# Patient Record
Sex: MALE | Race: BLACK OR AFRICAN AMERICAN | NOT HISPANIC OR LATINO
[De-identification: names, ages, dates, MRNs, and addresses within clinical notes are randomized per-mention and may not be internally consistent; named-entity substitution may affect disease eponyms.]

---

## 2017-01-24 ENCOUNTER — APPOINTMENT (OUTPATIENT)
Dept: ENDOCRINOLOGY | Facility: CLINIC | Age: 82
End: 2017-01-24

## 2017-03-01 ENCOUNTER — APPOINTMENT (OUTPATIENT)
Dept: ELECTROPHYSIOLOGY | Facility: CLINIC | Age: 82
End: 2017-03-01

## 2017-03-01 ENCOUNTER — APPOINTMENT (OUTPATIENT)
Dept: CARDIOLOGY | Facility: CLINIC | Age: 82
End: 2017-03-01

## 2017-04-06 ENCOUNTER — NON-APPOINTMENT (OUTPATIENT)
Age: 82
End: 2017-04-06

## 2017-04-06 ENCOUNTER — APPOINTMENT (OUTPATIENT)
Dept: ELECTROPHYSIOLOGY | Facility: CLINIC | Age: 82
End: 2017-04-06

## 2017-04-06 ENCOUNTER — APPOINTMENT (OUTPATIENT)
Dept: CARDIOLOGY | Facility: CLINIC | Age: 82
End: 2017-04-06

## 2017-04-06 VITALS
OXYGEN SATURATION: 98 % | SYSTOLIC BLOOD PRESSURE: 143 MMHG | DIASTOLIC BLOOD PRESSURE: 87 MMHG | WEIGHT: 182 LBS | HEART RATE: 56 BPM | HEIGHT: 71 IN | BODY MASS INDEX: 25.48 KG/M2

## 2017-04-06 VITALS — SYSTOLIC BLOOD PRESSURE: 108 MMHG | DIASTOLIC BLOOD PRESSURE: 72 MMHG

## 2017-04-06 DIAGNOSIS — I25.10 ATHEROSCLEROTIC HEART DISEASE OF NATIVE CORONARY ARTERY W/OUT ANGINA PECTORIS: ICD-10-CM

## 2017-04-06 RX ORDER — SITAGLIPTIN 25 MG/1
25 TABLET, FILM COATED ORAL DAILY
Qty: 30 | Refills: 0 | Status: ACTIVE | COMMUNITY
Start: 2017-04-06

## 2017-08-31 ENCOUNTER — APPOINTMENT (OUTPATIENT)
Dept: ELECTROPHYSIOLOGY | Facility: CLINIC | Age: 82
End: 2017-08-31
Payer: MEDICARE

## 2017-08-31 PROCEDURE — 93295 DEV INTERROG REMOTE 1/2/MLT: CPT

## 2017-10-04 ENCOUNTER — APPOINTMENT (OUTPATIENT)
Dept: ELECTROPHYSIOLOGY | Facility: CLINIC | Age: 82
End: 2017-10-04
Payer: MEDICARE

## 2017-10-04 ENCOUNTER — NON-APPOINTMENT (OUTPATIENT)
Age: 82
End: 2017-10-04

## 2017-10-04 VITALS — DIASTOLIC BLOOD PRESSURE: 85 MMHG | SYSTOLIC BLOOD PRESSURE: 145 MMHG | OXYGEN SATURATION: 99 % | HEART RATE: 55 BPM

## 2017-10-04 PROCEDURE — 93283 PRGRMG EVAL IMPLANTABLE DFB: CPT

## 2018-01-04 ENCOUNTER — APPOINTMENT (OUTPATIENT)
Dept: ELECTROPHYSIOLOGY | Facility: CLINIC | Age: 83
End: 2018-01-04
Payer: MEDICARE

## 2018-01-04 DIAGNOSIS — I25.5 ISCHEMIC CARDIOMYOPATHY: ICD-10-CM

## 2018-01-04 PROCEDURE — 93295 DEV INTERROG REMOTE 1/2/MLT: CPT

## 2018-01-18 PROBLEM — I25.5 ISCHEMIC CARDIOMYOPATHY: Status: ACTIVE | Noted: 2017-04-06

## 2018-03-08 ENCOUNTER — APPOINTMENT (OUTPATIENT)
Dept: ELECTROPHYSIOLOGY | Facility: CLINIC | Age: 83
End: 2018-03-08

## 2018-05-16 ENCOUNTER — APPOINTMENT (OUTPATIENT)
Dept: ELECTROPHYSIOLOGY | Facility: CLINIC | Age: 83
End: 2018-05-16
Payer: MEDICARE

## 2018-05-16 VITALS — DIASTOLIC BLOOD PRESSURE: 85 MMHG | SYSTOLIC BLOOD PRESSURE: 128 MMHG | HEART RATE: 72 BPM | OXYGEN SATURATION: 99 %

## 2018-05-16 PROCEDURE — 93283 PRGRMG EVAL IMPLANTABLE DFB: CPT

## 2018-07-11 ENCOUNTER — EMERGENCY (EMERGENCY)
Facility: HOSPITAL | Age: 83
LOS: 0 days | Discharge: HOME | End: 2018-07-11
Attending: EMERGENCY MEDICINE
Payer: MEDICARE

## 2018-07-11 VITALS
WEIGHT: 181 LBS | TEMPERATURE: 98 F | HEIGHT: 71 IN | DIASTOLIC BLOOD PRESSURE: 63 MMHG | SYSTOLIC BLOOD PRESSURE: 107 MMHG | RESPIRATION RATE: 17 BRPM | OXYGEN SATURATION: 100 % | HEART RATE: 72 BPM

## 2018-07-11 DIAGNOSIS — I10 ESSENTIAL (PRIMARY) HYPERTENSION: ICD-10-CM

## 2018-07-11 DIAGNOSIS — M54.5 LOW BACK PAIN: ICD-10-CM

## 2018-07-11 DIAGNOSIS — E11.9 TYPE 2 DIABETES MELLITUS WITHOUT COMPLICATIONS: ICD-10-CM

## 2018-07-11 DIAGNOSIS — Z95.5 PRESENCE OF CORONARY ANGIOPLASTY IMPLANT AND GRAFT: ICD-10-CM

## 2018-07-11 DIAGNOSIS — Z79.01 LONG TERM (CURRENT) USE OF ANTICOAGULANTS: ICD-10-CM

## 2018-07-11 DIAGNOSIS — M48.54XA COLLAPSED VERTEBRA, NOT ELSEWHERE CLASSIFIED, THORACIC REGION, INITIAL ENCOUNTER FOR FRACTURE: ICD-10-CM

## 2018-07-11 DIAGNOSIS — I25.10 ATHEROSCLEROTIC HEART DISEASE OF NATIVE CORONARY ARTERY WITHOUT ANGINA PECTORIS: ICD-10-CM

## 2018-07-11 DIAGNOSIS — M25.552 PAIN IN LEFT HIP: ICD-10-CM

## 2018-07-11 DIAGNOSIS — Z79.82 LONG TERM (CURRENT) USE OF ASPIRIN: ICD-10-CM

## 2018-07-11 DIAGNOSIS — Z95.5 PRESENCE OF CORONARY ANGIOPLASTY IMPLANT AND GRAFT: Chronic | ICD-10-CM

## 2018-07-11 DIAGNOSIS — Z79.4 LONG TERM (CURRENT) USE OF INSULIN: ICD-10-CM

## 2018-07-11 DIAGNOSIS — M25.551 PAIN IN RIGHT HIP: ICD-10-CM

## 2018-07-11 LAB
BASE EXCESS BLDA CALC-SCNC: 1 MMOL/L — SIGNIFICANT CHANGE UP (ref -2–2)
BLOOD GAS COMMENTS: SIGNIFICANT CHANGE UP
BLOOD GAS SOURCE: SIGNIFICANT CHANGE UP
HCO3 BLDA-SCNC: 26 MMOL/L — SIGNIFICANT CHANGE UP (ref 21–29)
HOROWITZ INDEX BLDA+IHG-RTO: 21 — SIGNIFICANT CHANGE UP
PCO2 BLDA: 46 MMHG — SIGNIFICANT CHANGE UP (ref 32–46)
PH BLD: 7.38 — SIGNIFICANT CHANGE UP (ref 7.35–7.45)
PO2 BLDA: 75 MMHG — SIGNIFICANT CHANGE UP (ref 74–108)
SAO2 % BLDA: 94 % — SIGNIFICANT CHANGE UP (ref 92–96)

## 2018-07-11 PROCEDURE — 99284 EMERGENCY DEPT VISIT MOD MDM: CPT

## 2018-07-11 PROCEDURE — 73521 X-RAY EXAM HIPS BI 2 VIEWS: CPT | Mod: 26

## 2018-07-11 PROCEDURE — 72100 X-RAY EXAM L-S SPINE 2/3 VWS: CPT | Mod: 26

## 2018-07-11 RX ORDER — OXYCODONE AND ACETAMINOPHEN 5; 325 MG/1; MG/1
1 TABLET ORAL ONCE
Qty: 0 | Refills: 0 | Status: DISCONTINUED | OUTPATIENT
Start: 2018-07-11 | End: 2018-07-11

## 2018-07-11 RX ORDER — IBUPROFEN 200 MG
1 TABLET ORAL
Qty: 20 | Refills: 0
Start: 2018-07-11 | End: 2018-07-15

## 2018-07-11 RX ORDER — OXYCODONE AND ACETAMINOPHEN 5; 325 MG/1; MG/1
1 TABLET ORAL
Qty: 16 | Refills: 0
Start: 2018-07-11 | End: 2018-07-14

## 2018-07-11 RX ORDER — IBUPROFEN 200 MG
600 TABLET ORAL ONCE
Qty: 0 | Refills: 0 | Status: COMPLETED | OUTPATIENT
Start: 2018-07-11 | End: 2018-07-11

## 2018-07-11 RX ADMIN — OXYCODONE AND ACETAMINOPHEN 1 TABLET(S): 5; 325 TABLET ORAL at 17:49

## 2018-07-11 RX ADMIN — Medication 600 MILLIGRAM(S): at 17:49

## 2018-07-11 NOTE — ED PROVIDER NOTE - OBJECTIVE STATEMENT
83 yo M with b/l hip and lower back pain, no trauma.  Pt. says he had the pain for years, but now worse for 2 days.  He's having trouble getting up and walking.  No inciting event.  No other stressors.  Pt. usually ambulates with a cane, now he can't get up by himself 2/2 pain.  No other complaints, no incontinence, no saddle anesthesia, normal sensation in legs b/l.  ROS: negative for fever, cough, headache, chest pain, shortness of breath, abd pain, nausea, vomiting, diarrhea, rash, paresthesia, and weakness--all other systems reviewed are negative.   PMH: DM, HTN, CAD s/p stent, on plavix, dementia; Meds: See EMR; SH: Denies smoking/drinking/drug use

## 2018-07-11 NOTE — ED ADULT NURSE NOTE - PMH
BPH (benign prostatic hyperplasia)    CKD (chronic kidney disease) stage 3, GFR 30-59 ml/min    Coronary artery disease involving native coronary artery of native heart, angina presence unspecified    Diabetes    Glaucoma    Hyperlipidemia, unspecified hyperlipidemia type    Hypertension    Ischemic cardiomyopathy    Left ventricular thrombus    Memory loss    ST elevation myocardial infarction (STEMI), unspecified artery    Stented coronary artery  5 stents  Systolic congestive heart failure, unspecified congestive heart failure chronicity    Thyroid nodule

## 2018-07-11 NOTE — ED PROVIDER NOTE - PHYSICAL EXAMINATION
Vitals: WNL  Gen: AAOx3, NAD, sitting comfortably in stretcher  Head: ncat, perrla, eomi b/l  Neck: supple, no lymphadenopathy, no midline deviation  Heart: rrr, no m/r/g  Lungs: CTA b/l, no rales/ronchi/wheezes  Abd: soft, nontender, non-distended, no rebound or guarding  Ext: no clubbing/cyanosis/edema  Neuro: sensation and muscle strength intact b/l, moving all 4 ext with equal strength, no deficits

## 2018-07-11 NOTE — ED PROVIDER NOTE - CARE PLAN
Principal Discharge DX:	Lumbar muscle pain Principal Discharge DX:	Lumbar muscle pain  Secondary Diagnosis:	Non-traumatic compression fracture of twelfth thoracic vertebra, initial encounter

## 2018-07-19 ENCOUNTER — APPOINTMENT (OUTPATIENT)
Dept: ELECTROPHYSIOLOGY | Facility: CLINIC | Age: 83
End: 2018-07-19
Payer: MEDICARE

## 2018-07-19 DIAGNOSIS — I50.20 UNSPECIFIED SYSTOLIC (CONGESTIVE) HEART FAILURE: ICD-10-CM

## 2018-07-19 PROCEDURE — 93295 DEV INTERROG REMOTE 1/2/MLT: CPT

## 2018-07-19 PROCEDURE — 93296 REM INTERROG EVL PM/IDS: CPT

## 2019-01-09 ENCOUNTER — APPOINTMENT (OUTPATIENT)
Dept: ELECTROPHYSIOLOGY | Facility: CLINIC | Age: 84
End: 2019-01-09
Payer: MEDICARE

## 2019-01-09 PROCEDURE — XXXXX: CPT

## 2019-04-03 ENCOUNTER — APPOINTMENT (OUTPATIENT)
Dept: ELECTROPHYSIOLOGY | Facility: CLINIC | Age: 84
End: 2019-04-03
Payer: MEDICARE

## 2019-04-03 VITALS
WEIGHT: 182 LBS | DIASTOLIC BLOOD PRESSURE: 73 MMHG | SYSTOLIC BLOOD PRESSURE: 118 MMHG | HEART RATE: 75 BPM | OXYGEN SATURATION: 100 % | BODY MASS INDEX: 25.48 KG/M2 | HEIGHT: 71 IN

## 2019-04-03 PROCEDURE — 93283 PRGRMG EVAL IMPLANTABLE DFB: CPT

## 2019-05-01 NOTE — REASON FOR VISIT
[Follow-Up - Clinic] : a clinic follow-up of [Coronary Artery Disease] : coronary artery disease [Heart Failure] : congestive heart failure [Hypertension] : hypertension [Medication Management] : Medication management

## 2019-05-03 ENCOUNTER — APPOINTMENT (OUTPATIENT)
Dept: CARDIOTHORACIC SURGERY | Facility: CLINIC | Age: 84
End: 2019-05-03
Payer: MEDICARE

## 2019-05-03 ENCOUNTER — NON-APPOINTMENT (OUTPATIENT)
Age: 84
End: 2019-05-03

## 2019-05-03 VITALS
RESPIRATION RATE: 12 BRPM | HEART RATE: 52 BPM | SYSTOLIC BLOOD PRESSURE: 157 MMHG | DIASTOLIC BLOOD PRESSURE: 76 MMHG | OXYGEN SATURATION: 99 % | TEMPERATURE: 98 F

## 2019-05-03 VITALS — WEIGHT: 182 LBS | HEIGHT: 71 IN | BODY MASS INDEX: 25.48 KG/M2

## 2019-05-03 VITALS — DIASTOLIC BLOOD PRESSURE: 78 MMHG | SYSTOLIC BLOOD PRESSURE: 132 MMHG

## 2019-05-03 DIAGNOSIS — Z79.01 LONG TERM (CURRENT) USE OF ANTICOAGULANTS: ICD-10-CM

## 2019-05-03 DIAGNOSIS — I10 ESSENTIAL (PRIMARY) HYPERTENSION: ICD-10-CM

## 2019-05-03 DIAGNOSIS — E78.5 HYPERLIPIDEMIA, UNSPECIFIED: ICD-10-CM

## 2019-05-03 PROCEDURE — 93000 ELECTROCARDIOGRAM COMPLETE: CPT

## 2019-05-03 PROCEDURE — 99214 OFFICE O/P EST MOD 30 MIN: CPT

## 2019-05-03 NOTE — HISTORY OF PRESENT ILLNESS
[FreeTextEntry1] : Sohan presents for cardiac follow up and as per his wife requires a dental extraction, for which his warfarin would need to be interrupted. He denies any angina, dyspnea or syncope. He is not very active to due activity, not due to symptoms. His appetite is good and his bathroom habits are unremarkable. His wife reports that he has become combative, cursing at night, due to progression of dementia. He saw a neurologist six months ago. His wife states that [facility] did not help patient.

## 2019-05-03 NOTE — PHYSICAL EXAM
[Well Groomed] : well groomed [Normal Appearance] : normal appearance [General Appearance - Well Developed] : well developed [General Appearance - Well Nourished] : well nourished [General Appearance - In No Acute Distress] : no acute distress [No Deformities] : no deformities [Normal Conjunctiva] : the conjunctiva exhibited no abnormalities [Eyelids - No Xanthelasma] : the eyelids demonstrated no xanthelasmas [Normal Oral Mucosa] : normal oral mucosa [No Oral Pallor] : no oral pallor [No Oral Cyanosis] : no oral cyanosis [Normal Jugular Venous A Waves Present] : normal jugular venous A waves present [Respiration, Rhythm And Depth] : normal respiratory rhythm and effort [Normal Jugular Venous V Waves Present] : normal jugular venous V waves present [] : no respiratory distress [No Murmur] : no murmurs heard [Normal Rate] : normal [No Pitting Edema] : no pitting edema present [Abdomen Soft] : soft [Bowel Sounds] : normal bowel sounds [Nail Clubbing] : no clubbing of the fingernails [Skin Color & Pigmentation] : normal skin color and pigmentation [Abnormal Walk] : normal gait [Oriented To Time, Place, And Person] : oriented to person, place, and time [Impaired Insight] : insight and judgment were intact [Right Carotid Bruit] : no bruit heard over the right carotid [Left Carotid Bruit] : no bruit heard over the left carotid [Rt] : no varicose veins of the right leg [Lt] : no varicose veins of the left leg

## 2019-05-03 NOTE — DISCUSSION/SUMMARY
[Risks] : risks [Benefits] : benefits [Alternatives] : alternatives [___ Month(s)] : [unfilled] month(s) [With Me] : with me [Coronary Artery Disease] : coronary artery disease [Chronic Systolic Heart Failure] : chronic systolic congestive heart failure [Compensated] : compensated [Essential Hypertension] : essential hypertension [Stable] : stable [Responding to Treatment] : responding to treatment [None] : none [Patient] : the patient [Family] : the patient's family [FreeTextEntry1] : Sohan is an 83 year old male who presents for routine cardiology visit. He requires a dental extraction and will need a clearance to stop Coumadin. I would recommend stopping his Coumadin 5 days before anticipated date of his upcoming dental procedure and he can resume the dose post-op as per the oral surgeon's recommendation.  He has known severe LV dysfunction (and an AICD) with no signs or symptoms of decompensated heart failure at this time.  Repeat BP was just at goal and I am making no changes to his regimen at this time; he is not on Spironolactone in the setting of CKD.  I recommended he follow up with his Neurologist.  He also will have full labs with his PCP.

## 2019-09-19 ENCOUNTER — APPOINTMENT (OUTPATIENT)
Dept: ELECTROPHYSIOLOGY | Facility: CLINIC | Age: 84
End: 2019-09-19
Payer: MEDICARE

## 2019-09-19 PROCEDURE — 93296 REM INTERROG EVL PM/IDS: CPT

## 2019-09-19 PROCEDURE — 93295 DEV INTERROG REMOTE 1/2/MLT: CPT

## 2019-12-19 ENCOUNTER — APPOINTMENT (OUTPATIENT)
Dept: ELECTROPHYSIOLOGY | Facility: CLINIC | Age: 84
End: 2019-12-19
Payer: MEDICARE

## 2019-12-19 PROCEDURE — 93296 REM INTERROG EVL PM/IDS: CPT

## 2019-12-19 PROCEDURE — 93295 DEV INTERROG REMOTE 1/2/MLT: CPT

## 2020-03-19 ENCOUNTER — APPOINTMENT (OUTPATIENT)
Dept: ELECTROPHYSIOLOGY | Facility: CLINIC | Age: 85
End: 2020-03-19
Payer: MEDICARE

## 2020-03-19 PROCEDURE — 93295 DEV INTERROG REMOTE 1/2/MLT: CPT

## 2020-03-19 PROCEDURE — 93296 REM INTERROG EVL PM/IDS: CPT

## 2020-06-19 ENCOUNTER — APPOINTMENT (OUTPATIENT)
Dept: ELECTROPHYSIOLOGY | Facility: CLINIC | Age: 85
End: 2020-06-19
Payer: MEDICARE

## 2020-06-19 PROCEDURE — 93295 DEV INTERROG REMOTE 1/2/MLT: CPT

## 2020-06-19 PROCEDURE — 93296 REM INTERROG EVL PM/IDS: CPT

## 2020-09-18 ENCOUNTER — APPOINTMENT (OUTPATIENT)
Dept: ELECTROPHYSIOLOGY | Facility: CLINIC | Age: 85
End: 2020-09-18
Payer: MEDICARE

## 2020-09-18 PROCEDURE — 93295 DEV INTERROG REMOTE 1/2/MLT: CPT

## 2020-09-18 PROCEDURE — 93296 REM INTERROG EVL PM/IDS: CPT

## 2020-09-23 ENCOUNTER — APPOINTMENT (OUTPATIENT)
Dept: ELECTROPHYSIOLOGY | Facility: CLINIC | Age: 85
End: 2020-09-23
Payer: MEDICARE

## 2020-09-23 ENCOUNTER — NON-APPOINTMENT (OUTPATIENT)
Age: 85
End: 2020-09-23

## 2020-09-23 VITALS
WEIGHT: 178 LBS | OXYGEN SATURATION: 99 % | DIASTOLIC BLOOD PRESSURE: 78 MMHG | HEIGHT: 71 IN | HEART RATE: 52 BPM | BODY MASS INDEX: 24.92 KG/M2 | SYSTOLIC BLOOD PRESSURE: 133 MMHG

## 2020-09-23 PROCEDURE — 93283 PRGRMG EVAL IMPLANTABLE DFB: CPT

## 2020-12-17 ENCOUNTER — APPOINTMENT (OUTPATIENT)
Dept: ELECTROPHYSIOLOGY | Facility: CLINIC | Age: 85
End: 2020-12-17

## 2020-12-17 ENCOUNTER — APPOINTMENT (OUTPATIENT)
Dept: ELECTROPHYSIOLOGY | Facility: CLINIC | Age: 85
End: 2020-12-17
Payer: MEDICARE

## 2020-12-17 PROCEDURE — 93295 DEV INTERROG REMOTE 1/2/MLT: CPT

## 2020-12-17 PROCEDURE — 93296 REM INTERROG EVL PM/IDS: CPT

## 2021-03-18 ENCOUNTER — NON-APPOINTMENT (OUTPATIENT)
Age: 86
End: 2021-03-18

## 2021-03-18 ENCOUNTER — APPOINTMENT (OUTPATIENT)
Dept: ELECTROPHYSIOLOGY | Facility: CLINIC | Age: 86
End: 2021-03-18
Payer: MEDICARE

## 2021-03-18 PROCEDURE — 93295 DEV INTERROG REMOTE 1/2/MLT: CPT

## 2021-03-18 PROCEDURE — 93296 REM INTERROG EVL PM/IDS: CPT

## 2021-06-17 ENCOUNTER — APPOINTMENT (OUTPATIENT)
Dept: ELECTROPHYSIOLOGY | Facility: CLINIC | Age: 86
End: 2021-06-17
Payer: MEDICARE

## 2021-06-17 ENCOUNTER — NON-APPOINTMENT (OUTPATIENT)
Age: 86
End: 2021-06-17

## 2021-06-17 PROCEDURE — 93295 DEV INTERROG REMOTE 1/2/MLT: CPT

## 2021-06-17 PROCEDURE — 93296 REM INTERROG EVL PM/IDS: CPT

## 2021-09-16 ENCOUNTER — NON-APPOINTMENT (OUTPATIENT)
Age: 86
End: 2021-09-16

## 2021-09-16 ENCOUNTER — APPOINTMENT (OUTPATIENT)
Dept: ELECTROPHYSIOLOGY | Facility: CLINIC | Age: 86
End: 2021-09-16
Payer: MEDICARE

## 2021-09-16 PROCEDURE — 93295 DEV INTERROG REMOTE 1/2/MLT: CPT

## 2021-09-16 PROCEDURE — 93296 REM INTERROG EVL PM/IDS: CPT

## 2021-11-09 ENCOUNTER — RESULT CHARGE (OUTPATIENT)
Age: 86
End: 2021-11-09

## 2021-11-10 ENCOUNTER — APPOINTMENT (OUTPATIENT)
Dept: ELECTROPHYSIOLOGY | Facility: CLINIC | Age: 86
End: 2021-11-10
Payer: MEDICARE

## 2021-11-10 ENCOUNTER — NON-APPOINTMENT (OUTPATIENT)
Age: 86
End: 2021-11-10

## 2021-11-10 VITALS
OXYGEN SATURATION: 97 % | HEART RATE: 64 BPM | WEIGHT: 156 LBS | SYSTOLIC BLOOD PRESSURE: 144 MMHG | HEIGHT: 71 IN | DIASTOLIC BLOOD PRESSURE: 79 MMHG | BODY MASS INDEX: 21.84 KG/M2

## 2021-11-10 PROCEDURE — 93000 ELECTROCARDIOGRAM COMPLETE: CPT | Mod: 59

## 2021-11-10 PROCEDURE — 93283 PRGRMG EVAL IMPLANTABLE DFB: CPT

## 2021-11-10 PROCEDURE — 99072 ADDL SUPL MATRL&STAF TM PHE: CPT

## 2021-12-16 ENCOUNTER — NON-APPOINTMENT (OUTPATIENT)
Age: 86
End: 2021-12-16

## 2021-12-16 ENCOUNTER — APPOINTMENT (OUTPATIENT)
Dept: ELECTROPHYSIOLOGY | Facility: CLINIC | Age: 86
End: 2021-12-16
Payer: MEDICARE

## 2021-12-16 PROCEDURE — 93295 DEV INTERROG REMOTE 1/2/MLT: CPT | Mod: NC

## 2021-12-16 PROCEDURE — 93296 REM INTERROG EVL PM/IDS: CPT | Mod: NC

## 2022-03-17 ENCOUNTER — APPOINTMENT (OUTPATIENT)
Dept: ELECTROPHYSIOLOGY | Facility: CLINIC | Age: 87
End: 2022-03-17
Payer: MEDICARE

## 2022-03-17 ENCOUNTER — NON-APPOINTMENT (OUTPATIENT)
Age: 87
End: 2022-03-17

## 2022-03-17 PROCEDURE — 93296 REM INTERROG EVL PM/IDS: CPT

## 2022-03-17 PROCEDURE — 93295 DEV INTERROG REMOTE 1/2/MLT: CPT

## 2022-06-16 ENCOUNTER — APPOINTMENT (OUTPATIENT)
Dept: ELECTROPHYSIOLOGY | Facility: CLINIC | Age: 87
End: 2022-06-16
Payer: MEDICARE

## 2022-06-16 ENCOUNTER — NON-APPOINTMENT (OUTPATIENT)
Age: 87
End: 2022-06-16

## 2022-06-16 PROCEDURE — 93296 REM INTERROG EVL PM/IDS: CPT

## 2022-06-16 PROCEDURE — 93295 DEV INTERROG REMOTE 1/2/MLT: CPT

## 2022-08-10 ENCOUNTER — APPOINTMENT (OUTPATIENT)
Dept: ELECTROPHYSIOLOGY | Facility: CLINIC | Age: 87
End: 2022-08-10

## 2022-08-11 ENCOUNTER — EMERGENCY (EMERGENCY)
Facility: HOSPITAL | Age: 87
LOS: 1 days | Discharge: HOME | End: 2022-08-11
Attending: EMERGENCY MEDICINE

## 2022-08-11 VITALS
SYSTOLIC BLOOD PRESSURE: 144 MMHG | RESPIRATION RATE: 18 BRPM | HEIGHT: 71 IN | DIASTOLIC BLOOD PRESSURE: 82 MMHG | HEART RATE: 82 BPM | OXYGEN SATURATION: 99 % | TEMPERATURE: 97 F | WEIGHT: 146.83 LBS

## 2022-08-11 DIAGNOSIS — Z79.02 LONG TERM (CURRENT) USE OF ANTITHROMBOTICS/ANTIPLATELETS: ICD-10-CM

## 2022-08-11 DIAGNOSIS — Z79.82 LONG TERM (CURRENT) USE OF ASPIRIN: ICD-10-CM

## 2022-08-11 DIAGNOSIS — Z79.84 LONG TERM (CURRENT) USE OF ORAL HYPOGLYCEMIC DRUGS: ICD-10-CM

## 2022-08-11 DIAGNOSIS — Z79.01 LONG TERM (CURRENT) USE OF ANTICOAGULANTS: ICD-10-CM

## 2022-08-11 DIAGNOSIS — I10 ESSENTIAL (PRIMARY) HYPERTENSION: ICD-10-CM

## 2022-08-11 DIAGNOSIS — E11.9 TYPE 2 DIABETES MELLITUS WITHOUT COMPLICATIONS: ICD-10-CM

## 2022-08-11 DIAGNOSIS — F03.90 UNSPECIFIED DEMENTIA WITHOUT BEHAVIORAL DISTURBANCE: ICD-10-CM

## 2022-08-11 DIAGNOSIS — Z95.5 PRESENCE OF CORONARY ANGIOPLASTY IMPLANT AND GRAFT: Chronic | ICD-10-CM

## 2022-08-11 DIAGNOSIS — R33.9 RETENTION OF URINE, UNSPECIFIED: ICD-10-CM

## 2022-08-11 DIAGNOSIS — Z95.5 PRESENCE OF CORONARY ANGIOPLASTY IMPLANT AND GRAFT: ICD-10-CM

## 2022-08-11 DIAGNOSIS — I25.10 ATHEROSCLEROTIC HEART DISEASE OF NATIVE CORONARY ARTERY WITHOUT ANGINA PECTORIS: ICD-10-CM

## 2022-08-11 LAB
APPEARANCE UR: CLEAR — SIGNIFICANT CHANGE UP
BACTERIA # UR AUTO: ABNORMAL
BILIRUB UR-MCNC: NEGATIVE — SIGNIFICANT CHANGE UP
COLOR SPEC: YELLOW — SIGNIFICANT CHANGE UP
DIFF PNL FLD: ABNORMAL
GLUCOSE UR QL: 1000 MG/DL
KETONES UR-MCNC: NEGATIVE — SIGNIFICANT CHANGE UP
LEUKOCYTE ESTERASE UR-ACNC: NEGATIVE — SIGNIFICANT CHANGE UP
NITRITE UR-MCNC: NEGATIVE — SIGNIFICANT CHANGE UP
PH UR: 6 — SIGNIFICANT CHANGE UP (ref 5–8)
PROT UR-MCNC: 100 MG/DL
RBC CASTS # UR COMP ASSIST: ABNORMAL /HPF (ref 0–4)
SP GR SPEC: 1.01 — SIGNIFICANT CHANGE UP (ref 1.01–1.02)
UROBILINOGEN FLD QL: NEGATIVE MG/DL — SIGNIFICANT CHANGE UP
WBC UR QL: SIGNIFICANT CHANGE UP

## 2022-08-11 PROCEDURE — 99053 MED SERV 10PM-8AM 24 HR FAC: CPT

## 2022-08-11 PROCEDURE — 99284 EMERGENCY DEPT VISIT MOD MDM: CPT

## 2022-08-11 NOTE — ED PROVIDER NOTE - PROGRESS NOTE DETAILS
Results reported to patient--grossly benign, labs unremarkable   Pt. reports feeling better after dale, straw colored urine in bag  pt. agrees to f/u with primary care outpt., uro referral given urgently for dale  pt. understands to return to ED if symptoms worsen; will d/c with leg bag

## 2022-08-11 NOTE — ED ADULT TRIAGE NOTE - CHIEF COMPLAINT QUOTE
pt presents to the ED c/o urinary urgency and retention since yesterday @~4pm. denies: abd pain, dysuria   hx defibrillator, dementia, diabetes

## 2022-08-11 NOTE — ED PROVIDER NOTE - PHYSICAL EXAMINATION
Vitals: HTN at 144/82, otherwise WNL  Gen: AAOx3, NAD, uncomfortably in stretcher, calm, non-toxic, relieved after dale placed with immediate flow of urine to bag  Head: ncat, perrla, eomi b/l  Neck: supple, no lymphadenopathy, no midline deviation  Heart: rrr, no m/r/g  Lungs: CTA b/l, no rales/ronchi/wheezes  Abd: soft, nontender, non-distended, no rebound or guarding  Ext: no clubbing/cyanosis/edema  Neuro: sensation and muscle strength intact b/l, steady gait

## 2022-08-11 NOTE — ED PROVIDER NOTE - CARE PROVIDER_API CALL
Mina Hoyt)  Urology  [address] [place] [address]  Phone: [phone]  Fax: [phone]  Follow Up Time: Urgent

## 2022-08-11 NOTE — ED PROVIDER NOTE - OBJECTIVE STATEMENT
85 yo M with urinary retention for 2 days.  Pt. has been complaining to wife that he has to go to the bathroom, but he can't pass urine.  Wife notes history of BPH, but never urinary retention.  No other complaints or associated symptoms.  Pt. is demented with baseline mental status--history obtained mainly from wife at bedside.  ROS: negative for fever, cough, headache, chest pain, shortness of breath, abd pain, nausea, vomiting, diarrhea, rash, paresthesia, and weakness--all other systems reviewed are negative.   PMH: DM, HTN, CAD s/p stent, on Plavix, dementia; Meds: warfarin, atorvastatin, glipizide, metoprolol, enalapril, Januvia, pioglitazone, ASA 81; SH: Denies smoking/drinking/drug use

## 2022-08-11 NOTE — ED PROVIDER NOTE - PATIENT PORTAL LINK FT
You can access the FollowMyHealth Patient Portal offered by [facility] by registering at the following website: [email or web address]. By joining [facility]’s FollowMyHealth portal, you will also be able to view your health information using other applications (apps) compatible with our system.

## 2022-08-11 NOTE — ED ADULT NURSE REASSESSMENT NOTE - NS ED NURSE REASSESS COMMENT FT1
pt dale bag replaced with leg bag, 400 ml urine output noted. pt instructed on how to take car of dale.

## 2022-08-12 LAB
CULTURE RESULTS: NO GROWTH — SIGNIFICANT CHANGE UP
SPECIMEN SOURCE: SIGNIFICANT CHANGE UP

## 2022-09-15 ENCOUNTER — NON-APPOINTMENT (OUTPATIENT)
Age: 87
End: 2022-09-15

## 2022-09-15 ENCOUNTER — APPOINTMENT (OUTPATIENT)
Dept: ELECTROPHYSIOLOGY | Facility: CLINIC | Age: 87
End: 2022-09-15

## 2022-09-15 PROCEDURE — 93295 DEV INTERROG REMOTE 1/2/MLT: CPT

## 2022-09-15 PROCEDURE — 93296 REM INTERROG EVL PM/IDS: CPT

## 2022-11-10 ENCOUNTER — APPOINTMENT (OUTPATIENT)
Dept: ELECTROPHYSIOLOGY | Facility: CLINIC | Age: 87
End: 2022-11-10

## 2022-11-10 VITALS — SYSTOLIC BLOOD PRESSURE: 173 MMHG | HEART RATE: 49 BPM | DIASTOLIC BLOOD PRESSURE: 81 MMHG | OXYGEN SATURATION: 99 %

## 2022-11-10 PROCEDURE — 93000 ELECTROCARDIOGRAM COMPLETE: CPT | Mod: 59

## 2022-11-10 PROCEDURE — 93283 PRGRMG EVAL IMPLANTABLE DFB: CPT

## 2023-02-09 ENCOUNTER — APPOINTMENT (OUTPATIENT)
Dept: ELECTROPHYSIOLOGY | Facility: CLINIC | Age: 88
End: 2023-02-09
Payer: MEDICARE

## 2023-02-09 ENCOUNTER — NON-APPOINTMENT (OUTPATIENT)
Age: 88
End: 2023-02-09

## 2023-02-09 PROCEDURE — 93295 DEV INTERROG REMOTE 1/2/MLT: CPT

## 2023-02-09 PROCEDURE — 93296 REM INTERROG EVL PM/IDS: CPT

## 2023-05-11 ENCOUNTER — NON-APPOINTMENT (OUTPATIENT)
Age: 88
End: 2023-05-11

## 2023-05-11 ENCOUNTER — APPOINTMENT (OUTPATIENT)
Dept: ELECTROPHYSIOLOGY | Facility: CLINIC | Age: 88
End: 2023-05-11
Payer: MEDICARE

## 2023-05-11 PROCEDURE — 93295 DEV INTERROG REMOTE 1/2/MLT: CPT

## 2023-05-11 PROCEDURE — 93296 REM INTERROG EVL PM/IDS: CPT

## 2023-06-29 ENCOUNTER — NON-APPOINTMENT (OUTPATIENT)
Age: 88
End: 2023-06-29

## 2023-06-29 ENCOUNTER — APPOINTMENT (OUTPATIENT)
Dept: ELECTROPHYSIOLOGY | Facility: CLINIC | Age: 88
End: 2023-06-29
Payer: MEDICARE

## 2023-06-29 VITALS
OXYGEN SATURATION: 98 % | DIASTOLIC BLOOD PRESSURE: 64 MMHG | SYSTOLIC BLOOD PRESSURE: 107 MMHG | HEART RATE: 56 BPM | HEIGHT: 71 IN

## 2023-06-29 PROCEDURE — 93000 ELECTROCARDIOGRAM COMPLETE: CPT | Mod: 59

## 2023-06-29 PROCEDURE — 93283 PRGRMG EVAL IMPLANTABLE DFB: CPT

## 2023-08-10 ENCOUNTER — APPOINTMENT (OUTPATIENT)
Dept: ELECTROPHYSIOLOGY | Facility: CLINIC | Age: 88
End: 2023-08-10

## 2023-09-28 ENCOUNTER — APPOINTMENT (OUTPATIENT)
Dept: ELECTROPHYSIOLOGY | Facility: CLINIC | Age: 88
End: 2023-09-28
Payer: MEDICARE

## 2023-09-28 ENCOUNTER — NON-APPOINTMENT (OUTPATIENT)
Age: 88
End: 2023-09-28

## 2023-09-28 PROCEDURE — 93295 DEV INTERROG REMOTE 1/2/MLT: CPT

## 2023-09-28 PROCEDURE — 93296 REM INTERROG EVL PM/IDS: CPT

## 2023-12-28 ENCOUNTER — APPOINTMENT (OUTPATIENT)
Dept: ELECTROPHYSIOLOGY | Facility: CLINIC | Age: 88
End: 2023-12-28
Payer: MEDICARE

## 2023-12-28 ENCOUNTER — NON-APPOINTMENT (OUTPATIENT)
Age: 88
End: 2023-12-28

## 2023-12-28 PROCEDURE — 93295 DEV INTERROG REMOTE 1/2/MLT: CPT

## 2023-12-28 PROCEDURE — 93296 REM INTERROG EVL PM/IDS: CPT

## 2024-02-26 ENCOUNTER — INPATIENT (INPATIENT)
Facility: HOSPITAL | Age: 89
LOS: 5 days | Discharge: HOSPICE | End: 2024-03-03
Attending: INTERNAL MEDICINE | Admitting: INTERNAL MEDICINE
Payer: MEDICARE

## 2024-02-26 ENCOUNTER — TRANSCRIPTION ENCOUNTER (OUTPATIENT)
Age: 89
End: 2024-02-26

## 2024-02-26 VITALS
SYSTOLIC BLOOD PRESSURE: 199 MMHG | DIASTOLIC BLOOD PRESSURE: 91 MMHG | HEART RATE: 68 BPM | RESPIRATION RATE: 18 BRPM | WEIGHT: 169.98 LBS | TEMPERATURE: 98 F | OXYGEN SATURATION: 100 % | HEIGHT: 73 IN

## 2024-02-26 DIAGNOSIS — Z95.5 PRESENCE OF CORONARY ANGIOPLASTY IMPLANT AND GRAFT: Chronic | ICD-10-CM

## 2024-02-26 LAB
ALBUMIN SERPL ELPH-MCNC: 3.6 G/DL — SIGNIFICANT CHANGE UP (ref 3.3–5)
ALP SERPL-CCNC: 121 U/L — HIGH (ref 40–120)
ALT FLD-CCNC: 21 U/L — SIGNIFICANT CHANGE UP (ref 12–78)
ANION GAP SERPL CALC-SCNC: 2 MMOL/L — LOW (ref 5–17)
APPEARANCE UR: CLEAR — SIGNIFICANT CHANGE UP
AST SERPL-CCNC: 16 U/L — SIGNIFICANT CHANGE UP (ref 15–37)
BASOPHILS # BLD AUTO: 0.06 K/UL — SIGNIFICANT CHANGE UP (ref 0–0.2)
BASOPHILS NFR BLD AUTO: 1.1 % — SIGNIFICANT CHANGE UP (ref 0–2)
BILIRUB SERPL-MCNC: 0.4 MG/DL — SIGNIFICANT CHANGE UP (ref 0.2–1.2)
BILIRUB UR-MCNC: NEGATIVE — SIGNIFICANT CHANGE UP
BUN SERPL-MCNC: 25 MG/DL — HIGH (ref 7–23)
CALCIUM SERPL-MCNC: 9.3 MG/DL — SIGNIFICANT CHANGE UP (ref 8.5–10.1)
CHLORIDE SERPL-SCNC: 105 MMOL/L — SIGNIFICANT CHANGE UP (ref 96–108)
CO2 SERPL-SCNC: 28 MMOL/L — SIGNIFICANT CHANGE UP (ref 22–31)
COLOR SPEC: YELLOW — SIGNIFICANT CHANGE UP
CREAT SERPL-MCNC: 1.93 MG/DL — HIGH (ref 0.5–1.3)
DIFF PNL FLD: NEGATIVE — SIGNIFICANT CHANGE UP
EGFR: 33 ML/MIN/1.73M2 — LOW
EOSINOPHIL # BLD AUTO: 0.05 K/UL — SIGNIFICANT CHANGE UP (ref 0–0.5)
EOSINOPHIL NFR BLD AUTO: 0.9 % — SIGNIFICANT CHANGE UP (ref 0–6)
GLUCOSE SERPL-MCNC: 374 MG/DL — HIGH (ref 70–99)
GLUCOSE UR QL: >=1000 MG/DL
HCT VFR BLD CALC: 43.7 % — SIGNIFICANT CHANGE UP (ref 39–50)
HGB BLD-MCNC: 13.8 G/DL — SIGNIFICANT CHANGE UP (ref 13–17)
IMM GRANULOCYTES NFR BLD AUTO: 0.4 % — SIGNIFICANT CHANGE UP (ref 0–0.9)
KETONES UR-MCNC: NEGATIVE MG/DL — SIGNIFICANT CHANGE UP
LEUKOCYTE ESTERASE UR-ACNC: NEGATIVE — SIGNIFICANT CHANGE UP
LIDOCAIN IGE QN: 99 U/L — HIGH (ref 13–75)
LYMPHOCYTES # BLD AUTO: 1.52 K/UL — SIGNIFICANT CHANGE UP (ref 1–3.3)
LYMPHOCYTES # BLD AUTO: 27 % — SIGNIFICANT CHANGE UP (ref 13–44)
MAGNESIUM SERPL-MCNC: 1.7 MG/DL — SIGNIFICANT CHANGE UP (ref 1.6–2.6)
MCHC RBC-ENTMCNC: 29.7 PG — SIGNIFICANT CHANGE UP (ref 27–34)
MCHC RBC-ENTMCNC: 31.6 G/DL — LOW (ref 32–36)
MCV RBC AUTO: 94.2 FL — SIGNIFICANT CHANGE UP (ref 80–100)
MONOCYTES # BLD AUTO: 0.59 K/UL — SIGNIFICANT CHANGE UP (ref 0–0.9)
MONOCYTES NFR BLD AUTO: 10.5 % — SIGNIFICANT CHANGE UP (ref 2–14)
NEUTROPHILS # BLD AUTO: 3.39 K/UL — SIGNIFICANT CHANGE UP (ref 1.8–7.4)
NEUTROPHILS NFR BLD AUTO: 60.1 % — SIGNIFICANT CHANGE UP (ref 43–77)
NITRITE UR-MCNC: NEGATIVE — SIGNIFICANT CHANGE UP
NRBC # BLD: 0 /100 WBCS — SIGNIFICANT CHANGE UP (ref 0–0)
NT-PROBNP SERPL-SCNC: 805 PG/ML — HIGH (ref 0–450)
PH UR: 7 — SIGNIFICANT CHANGE UP (ref 5–8)
PLATELET # BLD AUTO: 173 K/UL — SIGNIFICANT CHANGE UP (ref 150–400)
POTASSIUM SERPL-MCNC: 6.1 MMOL/L — HIGH (ref 3.5–5.3)
POTASSIUM SERPL-SCNC: 6.1 MMOL/L — HIGH (ref 3.5–5.3)
PROT SERPL-MCNC: 8.5 GM/DL — HIGH (ref 6–8.3)
PROT UR-MCNC: 100 MG/DL
RBC # BLD: 4.64 M/UL — SIGNIFICANT CHANGE UP (ref 4.2–5.8)
RBC # FLD: 12.4 % — SIGNIFICANT CHANGE UP (ref 10.3–14.5)
SODIUM SERPL-SCNC: 135 MMOL/L — SIGNIFICANT CHANGE UP (ref 135–145)
SP GR SPEC: 1.02 — SIGNIFICANT CHANGE UP (ref 1–1.03)
TROPONIN I, HIGH SENSITIVITY RESULT: 120.1 NG/L — HIGH
UROBILINOGEN FLD QL: 0.2 MG/DL — SIGNIFICANT CHANGE UP (ref 0.2–1)
WBC # BLD: 5.63 K/UL — SIGNIFICANT CHANGE UP (ref 3.8–10.5)
WBC # FLD AUTO: 5.63 K/UL — SIGNIFICANT CHANGE UP (ref 3.8–10.5)

## 2024-02-26 PROCEDURE — 93010 ELECTROCARDIOGRAM REPORT: CPT

## 2024-02-26 PROCEDURE — 71045 X-RAY EXAM CHEST 1 VIEW: CPT | Mod: 26

## 2024-02-26 PROCEDURE — 99285 EMERGENCY DEPT VISIT HI MDM: CPT

## 2024-02-26 RX ORDER — INSULIN GLARGINE 100 [IU]/ML
5 INJECTION, SOLUTION SUBCUTANEOUS ONCE
Refills: 0 | Status: COMPLETED | OUTPATIENT
Start: 2024-02-26 | End: 2024-02-26

## 2024-02-26 RX ADMIN — INSULIN GLARGINE 5 UNIT(S): 100 INJECTION, SOLUTION SUBCUTANEOUS at 21:49

## 2024-02-26 NOTE — ED PROVIDER NOTE - PROGRESS NOTE DETAILS
pt pulled out his iv and attempting to walk out, pt's wife is at the bedside, agrees for admission but continues to get up and walk forgetful to what was told to him, 1:1 placed and ativan ordered

## 2024-02-26 NOTE — ED PROVIDER NOTE - NSICDXPASTMEDICALHX_GEN_ALL_CORE_FT
PAST MEDICAL HISTORY:  BPH (benign prostatic hyperplasia)     CKD (chronic kidney disease) stage 3, GFR 30-59 ml/min     Coronary artery disease involving native coronary artery of native heart, angina presence unspecified     Diabetes     Glaucoma     Hyperlipidemia, unspecified hyperlipidemia type     Hypertension     Ischemic cardiomyopathy     Left ventricular thrombus     Memory loss     ST elevation myocardial infarction (STEMI), unspecified artery     Stented coronary artery 5 stents    Systolic congestive heart failure, unspecified congestive heart failure chronicity     Thyroid nodule

## 2024-02-26 NOTE — ED ADULT NURSE NOTE - NSFALLRISKINTERV_ED_ALL_ED
Assistance OOB with selected safe patient handling equipment if applicable/Assistance with ambulation/Communicate fall risk and risk factors to all staff, patient, and family/Monitor gait and stability/Monitor for mental status changes and reorient to person, place, and time, as needed/Provide visual cue: yellow wristband, yellow gown, etc/Reinforce activity limits and safety measures with patient and family/Toileting schedule using arm’s reach rule for commode and bathroom/Use of alarms - bed, stretcher, chair and/or video monitoring/Call bell, personal items and telephone in reach/Instruct patient to call for assistance before getting out of bed/chair/stretcher/Non-slip footwear applied when patient is off stretcher/[place] to call system/Physically safe environment - no spills, clutter or unnecessary equipment/Purposeful Proactive Rounding/Room/bathroom lighting operational, light cord in reach

## 2024-02-26 NOTE — ED PROVIDER NOTE - PHYSICAL EXAMINATION
CONSTITUTIONAL: thin elderly, chronically ill appearing   SKIN: Warm dry  HEAD: NCAT  EYES: NL inspection  NECK: Supple  CARD: RRR  RESP: CTAB  ABD: S/NT no R/G  EXT: no LE edema  NEURO: Grossly unremarkable  PSYCH: Cooperative, appropriate.

## 2024-02-26 NOTE — ED ADULT TRIAGE NOTE - CHIEF COMPLAINT QUOTE
Patient BIB [facility] c/o sudden onset of mid chest pain that lasted 20 minutes earlier this evening. Patient's wife gave 81mg asp and EMS gave additional 243mg asp to total 324mg. Patient denies chest pain currently. Pmh dementia, dm, htn. Non compliant with meds. Fs in field 375.

## 2024-02-26 NOTE — ED ADULT NURSE NOTE - OBJECTIVE STATEMENT
Pt BIBA fr chest pain a/w SOB that started earlier today. Pt AOx1 , hx of dementia, HTN & DM. Wife states pt at his baselline. Family states incompliant with meds. Pt was given 81mg ASA by wife & 243 mg by EMS on field. Pt denies any discomfort at this time. Placed pt on cardiac monitor. Wife and daughter at bedside. safety maintained.

## 2024-02-26 NOTE — ED PROVIDER NOTE - CLINICAL SUMMARY MEDICAL DECISION MAKING FREE TEXT BOX
Aron PGY3: 89yo M with PMH of dementia, DM2, HTN, HFrEF 35% s/p AICD, CAD s/p PCI, known non-compliance presents to ED for chest pain, now resolved. High risk for ACS. EKG sinus w/ 1st AV block, no acute ischemic but has TWI. Dementia, unreliable historian. Labs, trop, bnp. sugar control. WIll need ot be admitted for cards eval.

## 2024-02-26 NOTE — ED PROVIDER NOTE - OBJECTIVE STATEMENT
89yo M with PMH of dementia, DM2, HTN, HFrEF 35% s/p AICD, CAD s/p PCI, known non-compliance presents to ED for chest pain. All hx per wife as patient states he didn't have any pain and has been 'fine all night'. Around 6:30pm patient had 20min of holding his chest, bent over. Was diaphoretic and tachypnic. Currently states he has no pain. Wife gave him ASA 81 and EMS gave him 3 more. No SOB, CP, abd pain, NVDC. Pt states 'no one gave me any medications to take' but wife states he won't take them when offered.

## 2024-02-27 ENCOUNTER — RESULT REVIEW (OUTPATIENT)
Age: 89
End: 2024-02-27

## 2024-02-27 DIAGNOSIS — G30.9 ALZHEIMER'S DISEASE, UNSPECIFIED: ICD-10-CM

## 2024-02-27 DIAGNOSIS — N17.9 ACUTE KIDNEY FAILURE, UNSPECIFIED: ICD-10-CM

## 2024-02-27 DIAGNOSIS — R07.9 CHEST PAIN, UNSPECIFIED: ICD-10-CM

## 2024-02-27 DIAGNOSIS — E87.5 HYPERKALEMIA: ICD-10-CM

## 2024-02-27 DIAGNOSIS — I10 ESSENTIAL (PRIMARY) HYPERTENSION: ICD-10-CM

## 2024-02-27 DIAGNOSIS — E11.9 TYPE 2 DIABETES MELLITUS WITHOUT COMPLICATIONS: ICD-10-CM

## 2024-02-27 LAB
ANION GAP SERPL CALC-SCNC: 4 MMOL/L — LOW (ref 5–17)
BUN SERPL-MCNC: 21 MG/DL — SIGNIFICANT CHANGE UP (ref 7–23)
CALCIUM SERPL-MCNC: 8.9 MG/DL — SIGNIFICANT CHANGE UP (ref 8.5–10.1)
CHLORIDE SERPL-SCNC: 111 MMOL/L — HIGH (ref 96–108)
CO2 SERPL-SCNC: 27 MMOL/L — SIGNIFICANT CHANGE UP (ref 22–31)
CREAT SERPL-MCNC: 1.32 MG/DL — HIGH (ref 0.5–1.3)
EGFR: 53 ML/MIN/1.73M2 — LOW
GLUCOSE BLDC GLUCOMTR-MCNC: 107 MG/DL — HIGH (ref 70–99)
GLUCOSE BLDC GLUCOMTR-MCNC: 118 MG/DL — HIGH (ref 70–99)
GLUCOSE BLDC GLUCOMTR-MCNC: 121 MG/DL — HIGH (ref 70–99)
GLUCOSE BLDC GLUCOMTR-MCNC: 142 MG/DL — HIGH (ref 70–99)
GLUCOSE BLDC GLUCOMTR-MCNC: 247 MG/DL — HIGH (ref 70–99)
GLUCOSE BLDC GLUCOMTR-MCNC: 291 MG/DL — HIGH (ref 70–99)
GLUCOSE SERPL-MCNC: 120 MG/DL — HIGH (ref 70–99)
POTASSIUM SERPL-MCNC: 4.9 MMOL/L — SIGNIFICANT CHANGE UP (ref 3.5–5.3)
POTASSIUM SERPL-SCNC: 4.9 MMOL/L — SIGNIFICANT CHANGE UP (ref 3.5–5.3)
SODIUM SERPL-SCNC: 142 MMOL/L — SIGNIFICANT CHANGE UP (ref 135–145)
TROPONIN I, HIGH SENSITIVITY RESULT: 618.5 NG/L — HIGH

## 2024-02-27 PROCEDURE — 12345: CPT | Mod: NC

## 2024-02-27 PROCEDURE — 99223 1ST HOSP IP/OBS HIGH 75: CPT

## 2024-02-27 PROCEDURE — 93306 TTE W/DOPPLER COMPLETE: CPT | Mod: 26

## 2024-02-27 RX ORDER — CLOPIDOGREL BISULFATE 75 MG/1
75 TABLET, FILM COATED ORAL DAILY
Refills: 0 | Status: DISCONTINUED | OUTPATIENT
Start: 2024-02-27 | End: 2024-03-03

## 2024-02-27 RX ORDER — DEXTROSE 50 % IN WATER 50 %
25 SYRINGE (ML) INTRAVENOUS ONCE
Refills: 0 | Status: COMPLETED | OUTPATIENT
Start: 2024-02-27 | End: 2024-02-27

## 2024-02-27 RX ORDER — HUMAN INSULIN 100 [IU]/ML
7 INJECTION, SUSPENSION SUBCUTANEOUS ONCE
Refills: 0 | Status: COMPLETED | OUTPATIENT
Start: 2024-02-27 | End: 2024-02-27

## 2024-02-27 RX ORDER — METOPROLOL TARTRATE 50 MG
50 TABLET ORAL DAILY
Refills: 0 | Status: DISCONTINUED | OUTPATIENT
Start: 2024-02-27 | End: 2024-03-03

## 2024-02-27 RX ORDER — SODIUM BICARBONATE 1 MEQ/ML
50 SYRINGE (ML) INTRAVENOUS ONCE
Refills: 0 | Status: COMPLETED | OUTPATIENT
Start: 2024-02-27 | End: 2024-02-27

## 2024-02-27 RX ORDER — SODIUM CHLORIDE 9 MG/ML
1000 INJECTION, SOLUTION INTRAVENOUS
Refills: 0 | Status: DISCONTINUED | OUTPATIENT
Start: 2024-02-27 | End: 2024-03-03

## 2024-02-27 RX ORDER — SODIUM ZIRCONIUM CYCLOSILICATE 10 G/10G
10 POWDER, FOR SUSPENSION ORAL ONCE
Refills: 0 | Status: COMPLETED | OUTPATIENT
Start: 2024-02-27 | End: 2024-02-27

## 2024-02-27 RX ORDER — DEXTROSE 50 % IN WATER 50 %
25 SYRINGE (ML) INTRAVENOUS ONCE
Refills: 0 | Status: DISCONTINUED | OUTPATIENT
Start: 2024-02-27 | End: 2024-03-03

## 2024-02-27 RX ORDER — DEXTROSE 50 % IN WATER 50 %
15 SYRINGE (ML) INTRAVENOUS ONCE
Refills: 0 | Status: DISCONTINUED | OUTPATIENT
Start: 2024-02-27 | End: 2024-03-03

## 2024-02-27 RX ORDER — ATORVASTATIN CALCIUM 80 MG/1
80 TABLET, FILM COATED ORAL AT BEDTIME
Refills: 0 | Status: DISCONTINUED | OUTPATIENT
Start: 2024-02-27 | End: 2024-03-03

## 2024-02-27 RX ORDER — HEPARIN SODIUM 5000 [USP'U]/ML
5000 INJECTION INTRAVENOUS; SUBCUTANEOUS EVERY 12 HOURS
Refills: 0 | Status: DISCONTINUED | OUTPATIENT
Start: 2024-02-27 | End: 2024-03-03

## 2024-02-27 RX ORDER — ACETAMINOPHEN 500 MG
650 TABLET ORAL EVERY 6 HOURS
Refills: 0 | Status: DISCONTINUED | OUTPATIENT
Start: 2024-02-27 | End: 2024-03-03

## 2024-02-27 RX ORDER — ONDANSETRON 8 MG/1
4 TABLET, FILM COATED ORAL EVERY 8 HOURS
Refills: 0 | Status: DISCONTINUED | OUTPATIENT
Start: 2024-02-27 | End: 2024-03-03

## 2024-02-27 RX ORDER — LANOLIN ALCOHOL/MO/W.PET/CERES
3 CREAM (GRAM) TOPICAL AT BEDTIME
Refills: 0 | Status: DISCONTINUED | OUTPATIENT
Start: 2024-02-27 | End: 2024-03-03

## 2024-02-27 RX ORDER — SODIUM CHLORIDE 9 MG/ML
1000 INJECTION INTRAMUSCULAR; INTRAVENOUS; SUBCUTANEOUS
Refills: 0 | Status: DISCONTINUED | OUTPATIENT
Start: 2024-02-27 | End: 2024-03-02

## 2024-02-27 RX ORDER — ASPIRIN/CALCIUM CARB/MAGNESIUM 324 MG
81 TABLET ORAL DAILY
Refills: 0 | Status: DISCONTINUED | OUTPATIENT
Start: 2024-02-27 | End: 2024-03-03

## 2024-02-27 RX ORDER — INSULIN LISPRO 100/ML
VIAL (ML) SUBCUTANEOUS
Refills: 0 | Status: DISCONTINUED | OUTPATIENT
Start: 2024-02-27 | End: 2024-03-03

## 2024-02-27 RX ORDER — GLUCAGON INJECTION, SOLUTION 0.5 MG/.1ML
1 INJECTION, SOLUTION SUBCUTANEOUS ONCE
Refills: 0 | Status: DISCONTINUED | OUTPATIENT
Start: 2024-02-27 | End: 2024-03-03

## 2024-02-27 RX ADMIN — Medication 1 MILLIGRAM(S): at 01:37

## 2024-02-27 RX ADMIN — HUMAN INSULIN 7 UNIT(S): 100 INJECTION, SUSPENSION SUBCUTANEOUS at 02:59

## 2024-02-27 RX ADMIN — Medication 25 MILLILITER(S): at 03:01

## 2024-02-27 RX ADMIN — Medication 1 MILLIGRAM(S): at 02:01

## 2024-02-27 RX ADMIN — Medication 1 MILLIGRAM(S): at 16:42

## 2024-02-27 RX ADMIN — CLOPIDOGREL BISULFATE 75 MILLIGRAM(S): 75 TABLET, FILM COATED ORAL at 12:24

## 2024-02-27 RX ADMIN — Medication 50 MILLIEQUIVALENT(S): at 03:01

## 2024-02-27 RX ADMIN — HEPARIN SODIUM 5000 UNIT(S): 5000 INJECTION INTRAVENOUS; SUBCUTANEOUS at 06:32

## 2024-02-27 RX ADMIN — Medication 81 MILLIGRAM(S): at 12:24

## 2024-02-27 RX ADMIN — SODIUM CHLORIDE 70 MILLILITER(S): 9 INJECTION INTRAMUSCULAR; INTRAVENOUS; SUBCUTANEOUS at 06:40

## 2024-02-27 NOTE — H&P ADULT - PROBLEM SELECTOR PLAN 1
Acute onset of Chest pain lasted 20mins, received Aspirin, subsided prior to ED arrival   History of CAD s/p PCI  - Tele   - ASA   - Statin   - Plavix   - ECG   - Echo   - Cardio consult   - DVT Prophylaxis

## 2024-02-27 NOTE — PATIENT PROFILE ADULT - FALL HARM RISK - HARM RISK INTERVENTIONS
Assistance with ambulation/Assistance OOB with selected safe patient handling equipment/Communicate Risk of Fall with Harm to all staff/Monitor for mental status changes/Move patient closer to nurses' station/Reinforce activity limits and safety measures with patient and family/Reorient to person, place and time as needed/Tailored Fall Risk Interventions/Toileting schedule using arm’s reach rule for commode and bathroom/Use of alarms - bed, chair and/or voice tab/Visual Cue: Yellow wristband and red socks/Bed in lowest position, wheels locked, appropriate side rails in place/Call bell, personal items and telephone in reach/Instruct patient to call for assistance before getting out of bed or chair/Non-slip footwear when patient is out of bed/[place] to call system/Physically safe environment - no spills, clutter or unnecessary equipment/Purposeful Proactive Rounding/Room/bathroom lighting operational, light cord in reach

## 2024-02-27 NOTE — H&P ADULT - NSHPLABSRESULTS_GEN_ALL_CORE
13.8   5.63  )-----------( 173      ( 2024 21:37 )             43.7     135  |  105  |  25<H>  ----------------------------<  374<H>       6.1<H>   |  28  |  1.93<H>    Ca    9.3      2024 21:37  Mg     1.7         TPro  8.5<H>  /  Alb  3.6  /  TBili  0.4  /  DBili  x   /  AST  16  /  ALT  21  /  AlkPhos  121<H>          Pro-BNP: 805 (24 @ 21:37)      Urinalysis Basic - ( 2024 21:29 )  Color: Yellow / Appearance: Clear / S.017 / pH: 7.0  Gluc: >=1000 mg/dL / Ketone: Negative mg/dL  / Bili: Negative / Urobili: 0.2 mg/dL   Blood: Negative / Protein: 100 mg/dL / Nitrite: Negative   Leuk Esterase: Negative / RBC: 0-2 /HPF / WBC 0-2 /HPF   Sq Epi: Present / Non Sq Epi: x / Bacteria: Occasional /HPF

## 2024-02-27 NOTE — CONSULT NOTE ADULT - ASSESSMENT
The chart has been reviewed but the patient has not yet been examined.  Full note to follow. 89 yo male with known CAD and ischemic cardiomyopathy.  Advanced dementia, refusing meds.  Episode of chest pain with elevated troponins likely unstable angina, now asymptomatic.  Not a candidate for any interventions given dementai and inability to get patient meds.  Needs psych evaluation?    Lengthy discussion with wife and daughter at bedside, recommend conservative approach.  Should be seen by palliative care.  Use nitrates PRN chest pain and try to maintain his other maintenance meds.

## 2024-02-27 NOTE — H&P ADULT - PROBLEM SELECTOR PLAN 6
Upon ED arrival BP: 199/91  Non-complaint with medication   Continue home meds   - Enalapril   - Metoprolol succ  - Monitor BP

## 2024-02-27 NOTE — ED ADULT NURSE REASSESSMENT NOTE - NS ED NURSE REASSESS COMMENT FT1
Pt extremely apprehensive and uncooperative and unable to be redirected. Medication has been delayed due to the fact that patient has pulled out IV and wont allow nurses to put in another line. Safety maintained. Medicated as per MAR.

## 2024-02-27 NOTE — H&P ADULT - ASSESSMENT
88 year old male with a PMH of dementia, DM2, HTN, HFrEF 35% s/p AICD, CAD s/p PCI  presents to ED for chest pain. As per the wife, this evening patient was holding his chest, complaining of chest pain, and was found to be diaphoretic and tachypneic lasted about 20mins which prompted ED visit. Received Aspirin at home and in route by EMS. Upon ED arrival patient endorses pain has subsided, endorses he was feeling fine. Denies any chest pain, palpitation,  headache, or nay other acute symptoms. Patient became very agitated and uncooperative required ativan and 1:1 observation. Patient is non-compliant with home medication, as per wife patient refuses  med when offered. Labs remarkable for K:6.1, Trop:120.1 ECG-No ST changes, BNP:805, Cr: 1.93. BP: 199/91HR:

## 2024-02-27 NOTE — H&P ADULT - NSHPPHYSICALEXAM_GEN_ALL_CORE
GENERAL: NAD, comfortable at bedside   HEAD:  Atraumatic, Normocephalic  EYES: EOMI, PERRL, conjunctiva and sclera clear  NECK: Supple, No JVD  LUNG: Clear to auscultation bilaterally; No wheezes, rales or rhonchi  HEART: Regular rate and rhythm; No murmurs, rubs, or gallops, (+)S1, S2  ABDOMEN: Soft, Nontender, Nondistended; Normal Bowel sounds   EXTREMITIES:  2+ Peripheral Pulses, No clubbing, cyanosis, or edema  PSYCH: Agitated, Uncooperative, not-responding to re-direction   NEUROLOGY: AAOx2, non-focal  SKIN: No rashes or lesions

## 2024-02-27 NOTE — H&P ADULT - HISTORY OF PRESENT ILLNESS
87yo M with PMH of dementia, DM2, HTN, HFrEF 35% s/p AICD, CAD s/p PCI, known non-compliance presents to ED for chest pain. All hx per wife as patient states he didn't have any pain and has been 'fine all night'. Around 6:30pm patient had 20min of holding his chest, bent over. Was diaphoretic and tachypnic. Currently states he has no pain. Wife gave him ASA 81 and EMS gave him 3 more. No SOB, CP, abd pain, NVDC. Pt states 'no one gave me any medications to take' but wife states he won't take them when offered.   88 year old male with a PMH of dementia, DM2, HTN, HFrEF 35% s/p AICD, CAD s/p PCI  presents to ED for chest pain. As per the wife, this evening patient was holding his chest, complaining of chest pain, and was found to be diaphoretic and tachypneic lasted about 20mins which prompted ED visit. Received Aspirin at home and in route by EMS. Upon ED arrival patient endorses pain has subsided, endorses he was feeling fine. Denies any chest pain, palpitation,  headache, or nay other acute symptoms. Patient became very agitated and uncooperative required ativan and 1:1 observation. Patient is non-compliant with home medication, as per wife patient refuses  med when offered. Labs remarkable for  88 year old male with a PMH of dementia, DM2, HTN, HFrEF 35% s/p AICD, CAD s/p PCI  presents to ED for chest pain. As per the wife, this evening patient was holding his chest, complaining of chest pain, and was found to be diaphoretic and tachypneic lasted about 20mins which prompted ED visit. Received Aspirin at home and in route by EMS. Upon ED arrival patient endorses pain has subsided, endorses he was feeling fine. Denies any chest pain, palpitation,  headache, or nay other acute symptoms. Patient became very agitated and uncooperative required ativan and 1:1 observation. Patient is non-compliant with home medication, as per wife patient refuses  med when offered. Labs remarkable for K:6.1, Trop:120.1 ECG-No ST changes, BNP:805, Cr: 1.93. BP: 199/91HR:68

## 2024-02-27 NOTE — CONSULT NOTE ADULT - SUBJECTIVE AND OBJECTIVE BOX
CARDIOLOGY CONSULTATION NOTE                                                                               MERLYN GEIGER is a 88y Male with a PMH of dementia, DM2, HTN, HFrEF 35% s/p AICD, CAD s/p PCI  presents to ED for chest pain. As per the wife, this evening patient was holding his chest, complaining of chest pain, and was found to be diaphoretic and tachypneic lasted about 20mins which prompted ED visit. Received Aspirin at home and in route by EMS. Upon ED arrival patient endorses pain has subsided, endorses he was feeling fine. Denies any chest pain, palpitation,  headache, or nay other acute symptoms. Patient became very agitated and uncooperative required ativan and 1:1 observation. Patient is non-compliant with home medication, as per wife patient refuses  med when offered. Labs remarkable for K:6.1, Trop:120.1 ECG-No ST changes, BNP:805, Cr: 1.93. BP: 199/91HR:68 (27 Feb 2024 03:37)      REVIEW OF SYSTEMS:  -----------------------------    CONSTITUTIONAL: No fever, weight loss, or fatigue  EYES: No eye pain, visual disturbances, or discharge  ENMT:  No difficulty hearing, tinnitus, vertigo; No sinus or throat pain  NECK: No pain or stiffness  BREASTS: No pain, masses, or nipple discharge  RESPIRATORY: No cough, wheezing, chills or hemoptysis; No shortness of breath  CARDIOVASCULAR: See HPI  GASTROINTESTINAL: No abdominal or epigastric pain. No nausea, vomiting, or hematemesis; No diarrhea or constipation. No melena or hematochezia.  GENITOURINARY: No dysuria, frequency, hematuria, or incontinence  NEUROLOGICAL: No headaches, memory loss, loss of strength, numbness, or tremors  SKIN: No itching, burning, rashes, or lesions   LYMPH NODES: No enlarged glands  ENDOCRINE: No heat or cold intolerance; No hair loss  MUSCULOSKELETAL: No joint pain or swelling; No muscle, back, or extremity pain  PSYCHIATRIC: No depression, anxiety, mood swings, or difficulty sleeping  HEME/LYMPH: No easy bruising, or bleeding gums  ALLERGY AND IMMUNOLOGIC: No hives or eczema    Home Medications:  aspirin 81 mg oral tablet, chewable: 1 tab(s) orally once a day (27 Feb 2024 04:38)  atorvastatin 80 mg oral tablet: 1 tab(s) orally once a day (at bedtime) (27 Feb 2024 04:38)  enalapril 10 mg oral tablet: 1 tab(s) orally once a day  (27 Feb 2024 04:38)  metFORMIN 500 mg oral tablet: 1 tab(s) orally 2 times a day (27 Feb 2024 04:38)  metoprolol succinate 25 mg oral tablet, extended release: 2 tab(s) orally once a day (27 Feb 2024 04:38)  Plavix 75 mg oral tablet: 1 tab(s) orally once a day (27 Feb 2024 04:38)      MEDICATIONS  (STANDING):  aspirin  chewable 81 milliGRAM(s) Oral daily  atorvastatin 80 milliGRAM(s) Oral at bedtime  clopidogrel Tablet 75 milliGRAM(s) Oral daily  dextrose 5%. 1000 milliLiter(s) (50 mL/Hr) IV Continuous <Continuous>  dextrose 50% Injectable 25 Gram(s) IV Push once  enalapril 10 milliGRAM(s) Oral daily  glucagon  Injectable 1 milliGRAM(s) IntraMuscular once  heparin   Injectable 5000 Unit(s) SubCutaneous every 12 hours  insulin lispro (ADMELOG) corrective regimen sliding scale   SubCutaneous three times a day before meals  metoprolol succinate ER 50 milliGRAM(s) Oral daily  sodium chloride 0.9%. 1000 milliLiter(s) (70 mL/Hr) IV Continuous <Continuous>      ALLERGIES: No Known Allergies      FAMILY HISTORY:      PHYSICAL EXAMINATION:  -----------------------------  T(C): 36 (02-27-24 @ 10:42), Max: 37.1 (02-27-24 @ 03:54)  HR: 106 (02-27-24 @ 10:42) (60 - 106)  BP: 134/86 (02-27-24 @ 10:42) (133/74 - 199/91)  RR: 18 (02-27-24 @ 10:42) (18 - 18)  SpO2: 99% (02-27-24 @ 10:42) (93% - 100%)  Wt(kg): --    Height (cm): 185.4 (02-26 @ 20:21)  Weight (kg): 65.4 (02-27 @ 04:20)  BMI (kg/m2): 19 (02-27 @ 04:20)  BSA (m2): 1.87 (02-27 @ 04:20)    Constitutional: well developed, normal appearance, well groomed, well nourished, no deformities and no acute distress.   Eyes: the conjunctiva exhibited no abnormalities and the eyelids demonstrated no xanthelasmas.   HEENT: normal oral mucosa, no oral pallor and no oral cyanosis.   Neck: normal jugular venous A waves present, normal jugular venous V waves present and no jugular venous avery A waves.   Pulmonary: no respiratory distress, normal respiratory rhythm and effort, no accessory muscle use and lungs were clear to auscultation bilaterally.   Cardiovascular: heart rate and rhythm were normal, normal S1 and S2 and no murmur, gallop, rub, heave or thrill are present.   Abdomen: soft, non-tender, no hepato-splenomegaly and no abdominal mass palpated.   Musculoskeletal: the gait could not be assessed..   Extremities: no clubbing of the fingernails, no localized cyanosis, no petechial hemorrhages and no ischemic changes.   Skin: normal skin color and pigmentation, no rash, no venous stasis, no skin lesions, no skin ulcer and no xanthoma was observed.   Psychiatric: oriented to person, place, and time, the affect was normal, the mood was normal and not feeling anxious.     ECG:  -------        LABS:   --------  02-27    142  |  111<H>  |  21  ----------------------------<  120<H>  4.9   |  27  |  1.32<H>    Ca    8.9      27 Feb 2024 12:05  Mg     1.7     02-26    TPro  8.5<H>  /  Alb  3.6  /  TBili  0.4  /  DBili  x   /  AST  16  /  ALT  21  /  AlkPhos  121<H>  02-26                         13.8   5.63  )-----------( 173      ( 26 Feb 2024 21:37 )             43.7                 RADIOLOGY REPORTS:  -----------------------------        ECHOCARDIOGRAM:  ---------------------------         CARDIOLOGY CONSULTATION NOTE                                                                             MERLYN GEIGER is a 88y Male with a PMH of dementia, DM2, HTN, HFrEF 35% s/p AICD, CAD s/p PCI in 2015 presents to ED for chest pain. As per the wife, this evening patient was holding his chest, complaining of chest pain, and was found to be diaphoretic and tachypneic lasted about 20mins which prompted ED visit. Received Aspirin at home and in route by EMS. Upon ED arrival patient endorses pain has subsided, endorses he was feeling fine. Denies any chest pain, palpitation,  headache, or other acute symptoms. Patient became very agitated and uncooperative required ativan and 1:1 observation. Patient is consistently non-compliant with home medication, as per wife patient refuses  med when offered. Has not seen his cardiologist (Dr. Gray) for >4 years, does not follow with PCP.   REVIEW OF SYSTEMS: -----------------------------  CONSTITUTIONAL: No fever, weight loss, or fatigue EYES: No eye pain, visual disturbances, or discharge ENMT:  No difficulty hearing, tinnitus, vertigo; No sinus or throat pain NECK: No pain or stiffness BREASTS: No pain, masses, or nipple discharge RESPIRATORY: No cough, wheezing, chills or hemoptysis; No shortness of breath CARDIOVASCULAR: See HPI GASTROINTESTINAL: No abdominal or epigastric pain. No nausea, vomiting, or hematemesis; No diarrhea or constipation. No melena or hematochezia. GENITOURINARY: No dysuria, frequency, hematuria, or incontinence NEUROLOGICAL: No headaches, memory loss, loss of strength, numbness, or tremors SKIN: No itching, burning, rashes, or lesions  LYMPH NODES: No enlarged glands ENDOCRINE: No heat or cold intolerance; No hair loss MUSCULOSKELETAL: No joint pain or swelling; No muscle, back, or extremity pain PSYCHIATRIC: No depression, anxiety, mood swings, or difficulty sleeping HEME/LYMPH: No easy bruising, or bleeding gums ALLERGY AND IMMUNOLOGIC: No hives or eczema  Home Medications: aspirin 81 mg oral tablet, chewable: 1 tab(s) orally once a day (27 Feb 2024 04:38) atorvastatin 80 mg oral tablet: 1 tab(s) orally once a day (at bedtime) (27 Feb 2024 04:38) enalapril 10 mg oral tablet: 1 tab(s) orally once a day  (27 Feb 2024 04:38) metFORMIN 500 mg oral tablet: 1 tab(s) orally 2 times a day (27 Feb 2024 04:38) metoprolol succinate 25 mg oral tablet, extended release: 2 tab(s) orally once a day (27 Feb 2024 04:38) Plavix 75 mg oral tablet: 1 tab(s) orally once a day (27 Feb 2024 04:38)   MEDICATIONS  (STANDING): aspirin  chewable 81 milliGRAM(s) Oral daily atorvastatin 80 milliGRAM(s) Oral at bedtime clopidogrel Tablet 75 milliGRAM(s) Oral daily dextrose 5%. 1000 milliLiter(s) (50 mL/Hr) IV Continuous <Continuous> dextrose 50% Injectable 25 Gram(s) IV Push once enalapril 10 milliGRAM(s) Oral daily glucagon  Injectable 1 milliGRAM(s) IntraMuscular once heparin   Injectable 5000 Unit(s) SubCutaneous every 12 hours insulin lispro (ADMELOG) corrective regimen sliding scale   SubCutaneous three times a day before meals metoprolol succinate ER 50 milliGRAM(s) Oral daily sodium chloride 0.9%. 1000 milliLiter(s) (70 mL/Hr) IV Continuous <Continuous>   ALLERGIES: No Known Allergies   FAMILY HISTORY:   PHYSICAL EXAMINATION: ----------------------------- T(C): 36 (02-27-24 @ 10:42), Max: 37.1 (02-27-24 @ 03:54) HR: 106 (02-27-24 @ 10:42) (60 - 106) BP: 134/86 (02-27-24 @ 10:42) (133/74 - 199/91) RR: 18 (02-27-24 @ 10:42) (18 - 18) SpO2: 99% (02-27-24 @ 10:42) (93% - 100%) Wt(kg): --  Height (cm): 185.4 (02-26 @ 20:21) Weight (kg): 65.4 (02-27 @ 04:20) BMI (kg/m2): 19 (02-27 @ 04:20) BSA (m2): 1.87 (02-27 @ 04:20)  Constitutional: well developed, normal appearance, well groomed, well nourished, no deformities and no acute distress.  Eyes: the conjunctiva exhibited no abnormalities and the eyelids demonstrated no xanthelasmas.  HEENT: normal oral mucosa, no oral pallor and no oral cyanosis.  Neck: normal jugular venous A waves present, normal jugular venous V waves present and no jugular venous avery A waves.  Pulmonary: no respiratory distress, normal respiratory rhythm and effort, no accessory muscle use and lungs were clear to auscultation bilaterally.  Cardiovascular: heart rate and rhythm were normal, normal S1 and S2 and no murmur, gallop, rub, heave or thrill are present.  Abdomen: soft, non-tender, no hepato-splenomegaly and no abdominal mass palpated.  Musculoskeletal: the gait could not be assessed..  Extremities: no clubbing of the fingernails, no localized cyanosis, no petechial hemorrhages and no ischemic changes.  Skin: normal skin color and pigmentation, no rash, no venous stasis, no skin lesions, no skin ulcer and no xanthoma was observed.  Psychiatric: oriented to person only, appeared  anxious.   ECG: ------- < from: 12 Lead ECG (02.26.24 @ 20:57) >  Ventricular Rate 60 BPM  Atrial Rate 60 BPM  P-R Interval 212 ms  QRS Duration 96 ms  Q-T Interval 406 ms  QTC Calculation(Bazett) 406 ms  P Axis 54 degrees  R Axis -47 degrees  T Axis 109 degrees  Diagnosis Line Sinus rhythm [id number] degree AV block Left anterior fascicular block Septal infarct , age undetermined T wave abnormality, consider lateral ischemia Abnormal ECG No previous ECGs available Confirmed by PRIYANKA TRIPLETT MD ([id number]) on 2/27/2024 9:03:13 AM  < end of copied text >    LABS:  -------- 02-27  142  |  111<H>  |  21 ----------------------------<  120<H> 4.9   |  27  |  1.32<H>  Ca    8.9      27 Feb 2024 12:05 Mg     1.7     02-26  TPro  8.5<H>  /  Alb  3.6  /  TBili  0.4  /  DBili  x   /  AST  16  /  ALT  21  /  AlkPhos  121<H>  02-26                       13.8  5.63  )-----------( 173      ( 26 Feb 2024 21:37 )            43.7      Troponin I, High Sensitivity Result: 618.5: (02.27.24 @ 07:25)  Troponin I, High Sensitivity Result: 120.1: (02.26.24 @ 21:37)    RADIOLOGY REPORTS: ----------------------------- < from: Xray Chest 1 View AP/PA. (02.26.24 @ 22:22) >  ACC: [id number] EXAM:  XR CHEST AP OR PA 1V   ORDERED BY: JULIEN MCCAIN   PROCEDURE DATE:  02/26/2024      INTERPRETATION:  AP chest on February 26, 2024 at 9:47 PM. Patient has  chest pain.  Heart magnified by technique. Left-sided defibrillator again noted.  Presently there is a left coronary stent apparently new since 2016.  Present film shows a slight left base infiltrate new since November 22, 2016.  There is mild gaseous distention of the transverse colon is well.  IMPRESSION: Small left base infiltrate. Mild gaseous distention of the  transverse colon.  --- End of Report ---      YAMILET CHAPMAN MD; Attending Radiologist This document has been electronically signed. Feb 27 2024  8:49AM  < end of copied text >

## 2024-02-28 DIAGNOSIS — R53.81 OTHER MALAISE: ICD-10-CM

## 2024-02-28 DIAGNOSIS — Z51.5 ENCOUNTER FOR PALLIATIVE CARE: ICD-10-CM

## 2024-02-28 LAB
A1C WITH ESTIMATED AVERAGE GLUCOSE RESULT: 9 % — HIGH (ref 4–5.6)
ALBUMIN SERPL ELPH-MCNC: 3.5 G/DL — SIGNIFICANT CHANGE UP (ref 3.3–5)
ALBUMIN SERPL ELPH-MCNC: 3.6 G/DL — SIGNIFICANT CHANGE UP (ref 3.3–5)
ALP SERPL-CCNC: 106 U/L — SIGNIFICANT CHANGE UP (ref 40–120)
ALP SERPL-CCNC: 113 U/L — SIGNIFICANT CHANGE UP (ref 40–120)
ALT FLD-CCNC: 18 U/L — SIGNIFICANT CHANGE UP (ref 12–78)
ALT FLD-CCNC: 19 U/L — SIGNIFICANT CHANGE UP (ref 12–78)
ANION GAP SERPL CALC-SCNC: 4 MMOL/L — LOW (ref 5–17)
ANION GAP SERPL CALC-SCNC: 4 MMOL/L — LOW (ref 5–17)
AST SERPL-CCNC: 25 U/L — SIGNIFICANT CHANGE UP (ref 15–37)
AST SERPL-CCNC: 33 U/L — SIGNIFICANT CHANGE UP (ref 15–37)
BILIRUB SERPL-MCNC: 0.8 MG/DL — SIGNIFICANT CHANGE UP (ref 0.2–1.2)
BILIRUB SERPL-MCNC: 0.9 MG/DL — SIGNIFICANT CHANGE UP (ref 0.2–1.2)
BUN SERPL-MCNC: 20 MG/DL — SIGNIFICANT CHANGE UP (ref 7–23)
BUN SERPL-MCNC: 20 MG/DL — SIGNIFICANT CHANGE UP (ref 7–23)
CALCIUM SERPL-MCNC: 9.6 MG/DL — SIGNIFICANT CHANGE UP (ref 8.5–10.1)
CALCIUM SERPL-MCNC: 9.6 MG/DL — SIGNIFICANT CHANGE UP (ref 8.5–10.1)
CHLORIDE SERPL-SCNC: 109 MMOL/L — HIGH (ref 96–108)
CHLORIDE SERPL-SCNC: 111 MMOL/L — HIGH (ref 96–108)
CHOLEST SERPL-MCNC: 163 MG/DL — SIGNIFICANT CHANGE UP
CO2 SERPL-SCNC: 27 MMOL/L — SIGNIFICANT CHANGE UP (ref 22–31)
CO2 SERPL-SCNC: 27 MMOL/L — SIGNIFICANT CHANGE UP (ref 22–31)
CREAT SERPL-MCNC: 1.55 MG/DL — HIGH (ref 0.5–1.3)
CREAT SERPL-MCNC: 1.55 MG/DL — HIGH (ref 0.5–1.3)
EGFR: 43 ML/MIN/1.73M2 — LOW
EGFR: 43 ML/MIN/1.73M2 — LOW
ESTIMATED AVERAGE GLUCOSE: 212 MG/DL — HIGH (ref 68–114)
GLUCOSE BLDC GLUCOMTR-MCNC: 100 MG/DL — HIGH (ref 70–99)
GLUCOSE BLDC GLUCOMTR-MCNC: 138 MG/DL — HIGH (ref 70–99)
GLUCOSE BLDC GLUCOMTR-MCNC: 225 MG/DL — HIGH (ref 70–99)
GLUCOSE SERPL-MCNC: 142 MG/DL — HIGH (ref 70–99)
GLUCOSE SERPL-MCNC: 152 MG/DL — HIGH (ref 70–99)
HCT VFR BLD CALC: 44.1 % — SIGNIFICANT CHANGE UP (ref 39–50)
HDLC SERPL-MCNC: 47 MG/DL — SIGNIFICANT CHANGE UP
HGB BLD-MCNC: 14.2 G/DL — SIGNIFICANT CHANGE UP (ref 13–17)
LIPID PNL WITH DIRECT LDL SERPL: 102 MG/DL — HIGH
MAGNESIUM SERPL-MCNC: 1.7 MG/DL — SIGNIFICANT CHANGE UP (ref 1.6–2.6)
MCHC RBC-ENTMCNC: 29.7 PG — SIGNIFICANT CHANGE UP (ref 27–34)
MCHC RBC-ENTMCNC: 32.2 G/DL — SIGNIFICANT CHANGE UP (ref 32–36)
MCV RBC AUTO: 92.3 FL — SIGNIFICANT CHANGE UP (ref 80–100)
NON HDL CHOLESTEROL: 116 MG/DL — SIGNIFICANT CHANGE UP
NRBC # BLD: 0 /100 WBCS — SIGNIFICANT CHANGE UP (ref 0–0)
PHOSPHATE SERPL-MCNC: 2.3 MG/DL — LOW (ref 2.5–4.5)
PLATELET # BLD AUTO: 176 K/UL — SIGNIFICANT CHANGE UP (ref 150–400)
POTASSIUM SERPL-MCNC: 5.9 MMOL/L — HIGH (ref 3.5–5.3)
POTASSIUM SERPL-MCNC: 6.1 MMOL/L — HIGH (ref 3.5–5.3)
POTASSIUM SERPL-SCNC: 5.9 MMOL/L — HIGH (ref 3.5–5.3)
POTASSIUM SERPL-SCNC: 6.1 MMOL/L — HIGH (ref 3.5–5.3)
PROT SERPL-MCNC: 7.7 GM/DL — SIGNIFICANT CHANGE UP (ref 6–8.3)
PROT SERPL-MCNC: 8.1 GM/DL — SIGNIFICANT CHANGE UP (ref 6–8.3)
RBC # BLD: 4.78 M/UL — SIGNIFICANT CHANGE UP (ref 4.2–5.8)
RBC # FLD: 12.4 % — SIGNIFICANT CHANGE UP (ref 10.3–14.5)
SODIUM SERPL-SCNC: 140 MMOL/L — SIGNIFICANT CHANGE UP (ref 135–145)
SODIUM SERPL-SCNC: 142 MMOL/L — SIGNIFICANT CHANGE UP (ref 135–145)
TRIGL SERPL-MCNC: 71 MG/DL — SIGNIFICANT CHANGE UP
WBC # BLD: 4.28 K/UL — SIGNIFICANT CHANGE UP (ref 3.8–10.5)
WBC # FLD AUTO: 4.28 K/UL — SIGNIFICANT CHANGE UP (ref 3.8–10.5)

## 2024-02-28 PROCEDURE — 99232 SBSQ HOSP IP/OBS MODERATE 35: CPT

## 2024-02-28 PROCEDURE — 99223 1ST HOSP IP/OBS HIGH 75: CPT

## 2024-02-28 RX ORDER — MAGNESIUM SULFATE 500 MG/ML
1 VIAL (ML) INJECTION ONCE
Refills: 0 | Status: COMPLETED | OUTPATIENT
Start: 2024-02-28 | End: 2024-02-28

## 2024-02-28 RX ORDER — SODIUM ZIRCONIUM CYCLOSILICATE 10 G/10G
10 POWDER, FOR SUSPENSION ORAL ONCE
Refills: 0 | Status: DISCONTINUED | OUTPATIENT
Start: 2024-02-28 | End: 2024-02-28

## 2024-02-28 RX ORDER — SODIUM ZIRCONIUM CYCLOSILICATE 10 G/10G
5 POWDER, FOR SUSPENSION ORAL ONCE
Refills: 0 | Status: COMPLETED | OUTPATIENT
Start: 2024-02-28 | End: 2024-02-28

## 2024-02-28 RX ORDER — SODIUM ZIRCONIUM CYCLOSILICATE 10 G/10G
5 POWDER, FOR SUSPENSION ORAL ONCE
Refills: 0 | Status: DISCONTINUED | OUTPATIENT
Start: 2024-02-28 | End: 2024-02-28

## 2024-02-28 RX ORDER — INSULIN HUMAN 100 [IU]/ML
10 INJECTION, SOLUTION SUBCUTANEOUS ONCE
Refills: 0 | Status: COMPLETED | OUTPATIENT
Start: 2024-02-28 | End: 2024-02-28

## 2024-02-28 RX ORDER — DEXTROSE 50 % IN WATER 50 %
50 SYRINGE (ML) INTRAVENOUS ONCE
Refills: 0 | Status: COMPLETED | OUTPATIENT
Start: 2024-02-28 | End: 2024-02-28

## 2024-02-28 RX ADMIN — Medication 100 GRAM(S): at 18:23

## 2024-02-28 RX ADMIN — INSULIN HUMAN 10 UNIT(S): 100 INJECTION, SOLUTION SUBCUTANEOUS at 19:31

## 2024-02-28 RX ADMIN — Medication 50 MILLILITER(S): at 19:29

## 2024-02-28 RX ADMIN — Medication 1 MILLIGRAM(S): at 22:08

## 2024-02-28 RX ADMIN — ATORVASTATIN CALCIUM 80 MILLIGRAM(S): 80 TABLET, FILM COATED ORAL at 22:08

## 2024-02-28 RX ADMIN — Medication 1 MILLIGRAM(S): at 01:13

## 2024-02-28 RX ADMIN — Medication 3 MILLIGRAM(S): at 22:07

## 2024-02-28 RX ADMIN — Medication 1 MILLIGRAM(S): at 17:44

## 2024-02-28 RX ADMIN — Medication 1 MILLIGRAM(S): at 04:41

## 2024-02-28 NOTE — PROGRESS NOTE ADULT - NS ATTEND AMEND GEN_ALL_CORE FT
Palliative care consult reviewed.  Advanced cardiac disease with likely underlying unstable angina.  Not a candidate for intervention.  Can qualify for hospice?  Will follow only as needed.

## 2024-02-28 NOTE — CONSULT NOTE ADULT - SUBJECTIVE AND OBJECTIVE BOX
Consult to: Discuss complex medical decision making related to goals of care    [facility] Palliative Care Consult Service:   MD Mona Pearson, HAMLET    May contact any member of Palliative Care team via Microsoft Teams for consults or questions       HPI:  88 year old male with a PMH of dementia, DM2, HTN, HFrEF 35% s/p AICD, CAD s/p PCI  presents to ED for chest pain. As per the wife, this evening patient was holding his chest, complaining of chest pain, and was found to be diaphoretic and tachypneic lasted about 20mins which prompted ED visit. Received Aspirin at home and in route by EMS. Upon ED arrival patient endorses pain has subsided, endorses he was feeling fine. Denies any chest pain, palpitation,  headache, or nay other acute symptoms. Patient became very agitated and uncooperative required ativan and 1:1 observation. Patient is non-compliant with home medication, as per wife patient refuses  med when offered. Labs remarkable for K:6.1, Trop:120.1 ECG-No ST changes, BNP:805, Cr: 1.93. BP: 199/91HR:68 (2024 03:37)    Interval history: confused, NAD, calm. Denies pain or SOB.      PAST MEDICAL & SURGICAL HISTORY:  Diabetes      Hypertension      BPH (benign prostatic hyperplasia)      Glaucoma      Stented coronary artery  5 stents      Systolic congestive heart failure, unspecified congestive heart failure chronicity      Coronary artery disease involving native coronary artery of native heart, angina presence unspecified      Hyperlipidemia, unspecified hyperlipidemia type      ST elevation myocardial infarction (STEMI), unspecified artery      Left ventricular thrombus      Thyroid nodule      Ischemic cardiomyopathy      Memory loss      CKD (chronic kidney disease) stage 3, GFR 30-59 ml/min      Stented coronary artery          FAMILY HISTORY:   unable to obtain from patient due to poor mentation, family unable to give information, see H&P for history      SOCIAL HISTORY: , has children  Admitted from:  home    Sabianism/spirituality:   [ none ] Substance abuse, [ none ] Tobacco hx, [ none ] Alcohol hx  [ none ] Home Opioid use      Baseline ADLs (prior to admission):     Review of systems:     Pain:  [ ] yes [x ] no  QOL impact -   Location -                    Aggravating factors -  Quality -  Radiation -  Timing-  Severity (0-10 scale):  Minimal acceptable level (0-10 scale):     PAIN AD Score:     [email or web address] (press ctrl +  left click to view)  CPOT:    https://www.[email or web address]/getattachment/[email or web address]/Critical-Care-Pain-Observation-Tool-(CPOT)      Dyspnea:      denies                         Limited due to poor mental status    Allergies    No Known Allergies    Intolerances           MEDICATIONS  (STANDING):  aspirin  chewable 81 milliGRAM(s) Oral daily  atorvastatin 80 milliGRAM(s) Oral at bedtime  clopidogrel Tablet 75 milliGRAM(s) Oral daily  dextrose 5%. 1000 milliLiter(s) (50 mL/Hr) IV Continuous <Continuous>  dextrose 50% Injectable 25 Gram(s) IV Push once  glucagon  Injectable 1 milliGRAM(s) IntraMuscular once  heparin   Injectable 5000 Unit(s) SubCutaneous every 12 hours  insulin lispro (ADMELOG) corrective regimen sliding scale   SubCutaneous three times a day before meals  magnesium sulfate  IVPB 1 Gram(s) IV Intermittent once  metoprolol succinate ER 50 milliGRAM(s) Oral daily  sodium chloride 0.9%. 1000 milliLiter(s) (70 mL/Hr) IV Continuous <Continuous>  sodium zirconium cyclosilicate 5 Gram(s) Oral once    MEDICATIONS  (PRN):  acetaminophen     Tablet .. 650 milliGRAM(s) Oral every 6 hours PRN Temp greater or equal to 38C (100.4F), Mild Pain (1 - 3)  aluminum hydroxide/magnesium hydroxide/simethicone Suspension 30 milliLiter(s) Oral every 4 hours PRN Dyspepsia  dextrose Oral Gel 15 Gram(s) Oral once PRN Blood Glucose LESS THAN 70 milliGRAM(s)/deciliter  LORazepam   Injectable 1 milliGRAM(s) IV Push every 4 hours PRN Agitation  melatonin 3 milliGRAM(s) Oral at bedtime PRN Insomnia  ondansetron Injectable 4 milliGRAM(s) IV Push every 8 hours PRN Nausea and/or Vomiting      PHYSICAL EXAM:  Vital Signs Last 24 Hrs  T(C): 36.4 (2024 10:48), Max: 36.5 (2024 01:16)  T(F): 97.6 (2024 10:48), Max: 97.7 (2024 01:16)  HR: 65 (2024 10:48) (65 - 77)  BP: 146/90 (2024 10:48) (120/71 - 157/90)  BP(mean): --  RR: 18 (2024 10:48) (18 - 18)  SpO2: 96% (2024 10:48) (96% - 99%)    Parameters below as of 2024 10:48  Patient On (Oxygen Delivery Method): room air         Palliative Performance Scale/Karnofsky Score: 40     GENERAL: alert, NAD  HEENT: Atraumatic, oropharynx clear, neck supple  CHEST/LUNG: unlabored  HEART: Regular rate and rhythm    ABDOMEN: Soft, Nontender, Nondistended   MUSCULOSKELETAL:  No  edema   NERVOUS SYSTEM:  Alert & Oriented X1,  follows simple commands  SKIN: No rashes or lesions noted  Oral intake: fair     LABS:                        14.2   4.28  )-----------( 176      ( 2024 06:05 )             44.1         142  |  111<H>  |  20  ----------------------------<  142<H>  5.9<H>   |  27  |  1.55<H>    Ca    9.6      2024 09:45  Phos  2.3       Mg     1.7         TPro  7.7  /  Alb  3.5  /  TBili  0.8  /  DBili  x   /  AST  25  /  ALT  19  /  AlkPhos  106      Urinalysis Basic - ( 2024 09:45 )    Color: x / Appearance: x / SG: x / pH: x  Gluc: 142 mg/dL / Ketone: x  / Bili: x / Urobili: x   Blood: x / Protein: x / Nitrite: x   Leuk Esterase: x / RBC: x / WBC x   Sq Epi: x / Non Sq Epi: x / Bacteria: x        RADIOLOGY & ADDITIONAL STUDIES:  < from: TTE Echo Complete w/o Contrast w/ Doppler (24 @ 11:41) >    ACC: [id number] EXAM:  ECHO TTE WO CON COMP W DOPP                          PROCEDURE DATE:  2024          INTERPRETATION:  TRANSTHORACIC ECHOCARDIOGRAM REPORT        Patient Name:   MERLYN GEIGER Patient Location: [facility] Rec #:  [id number] Accession #:      [id number]  Account #:      [id number]     Height:           72.8 in 185.0 cm  YOB: 1935   Weight:           144.2 lb 65.40 kg  Patient Age:    88 years     BSA:              1.87 m²  Patient Gender: M            BP:            133/74 mmHg      Date of Exam:        2024 11:41:23 AM  Sonographer:         MARIELA  Referring Physician: RIKA WHATLEY    Procedure:     2D Echo/Doppler/Color Doppler Complete.  Indications:   R07.9 - Chest pain, unspecified  Diagnosis:     R07.9 - Chest pain, unspecified  Study Details: Technically suboptimal study.        2D AND M-MODE MEASUREMENTS (normal ranges within parentheses):  Left                 Normal   Aorta/Left            Normal  Ventricle:                    Atrium:  IVSd (2D):    1.17  (0.7-1.1) Aortic Root    3.60  (2.4-3.7)                 cm             (2D):           cm  LVPWd (2D):   1.18  (0.7-1.1) Left Atrium    2.70  (1.9-4.0)                 cm             (2D):           cm  LVIDd (2D):   4.08  (3.4-5.7) LA Vol Index   16.3                 cm             (A4C):        ml/m²  LVIDs (2D):   2.80                 cm  LV FS (2D):   31.4   (>25%)                  %  Relative Wall 0.58   (<0.42)  Thickness    LV DIASTOLIC FUNCTION:  MV Peak E: 0.36 m/s Decel Time:  161 msec  MV Peak A: 1.02 m/s Septal E/e'  10.6  E/A Ratio: 0.35     Lateral E/e' 8.7  Septal e'  0.0 m/s  Lateral e' 0.0 m/s    SPECTRAL DOPPLER ANALYSIS (where applicable):  Mitral Valve:  MV P1/2 Time: 46.73 msec  MV Area, PHT: 4.71cm²    Aortic Valve: AoV Max Lance: 0.68 m/s AoV Peak P.9 mmHg AoV Mean P.1 mmHg    LVOT Vmax: 0.69 m/s LVOT VTI: 0.143 m LVOT Diameter: 1.81 cm    AoV Area, Vmax: 2.58 cm² AoV Area, VTI: 2.75 cm² AoV Area, Vmn:    Tricuspid Valve and PA/RV Systolic Pressure: TR Max Velocity: 2.32 m/s RA   Pressure: 3 mmHg RVSP/PASP: 24.5 mmHg      PHYSICIAN INTERPRETATION:  Left Ventricle: Normal left ventricular size and wall thicknesses, with   normal systolic and diastolic function. The left ventriclewas not well   visualized.  Global LV systolic function was moderately decreased. No subcostal views.   Lateral and anterior walls are not well visualized. Recommend ultrasound   enhancing agent for better assessment of LV function/wall motion.      LV Wall Scoring:  The entire apex and mid and apical anterior septum are akinetic.    Right Ventricle: Normal right ventricular size and function.  Left Atrium: The left atrium is normal in size.  Right Atrium: The right atrium is normal in size.  Pericardium: There is no evidence of pericardial effusion.  Mitral Valve: Structurally normal mitral valve, with normal leaflet   excursion. No evidence of mitral valve regurgitation is seen.  Tricuspid Valve: Structurally normal tricuspid valve, with normal leaflet   excursion. No tricuspid regurgitation is visualized.  Aortic Valve: Normal trileaflet aortic valve with normal opening. No   evidence of aortic valve regurgitation is seen.  Pulmonic Valve: Structurally normal pulmonic valve, with normal leaflet   excursion. No indication of pulmonic valve regurgitation.  Aorta: The aortic root and ascending aorta are structurally normal, with   no evidence of dilitation.  Pulmonary Artery: The main pulmonary artery is normal in size.  Additional Comments: A pacer wire is visualized in the right atrium and   right ventricle.      Summary:   1. Moderately decreased global left ventricular systolic function.   2. Entire apex and mid and apical anterior septum are abnormal as   described above.   3. No subcostal views. Lateral and anterior walls are not well   visualized. Recommend ultrasound enhancing agent for better assessment of   LV function/wall motion.   4. No evidence of mitral valve regurgitation.      [id number] Dez Davalos MD, FACC  Electronically signed on 2024 at 10:07:44 PM    < end of copied text >

## 2024-02-28 NOTE — CONSULT NOTE ADULT - PROBLEM SELECTOR RECOMMENDATION 9
hx CAD s/p PCI, ICM s/p AICD, EF ~35%.  cardiology eval noted, not a candidate for invasive procedures given dementia and refusing meds. continue medical management, NTG prn. currently denies pain.

## 2024-02-28 NOTE — CONSULT NOTE ADULT - PROBLEM SELECTOR RECOMMENDATION 4
Called wife to address GOC, current condition, unable to reach. May be hospice eligible depending on goals. Palliative will follow.

## 2024-02-28 NOTE — PROVIDER CONTACT NOTE (CHANGE IN STATUS NOTIFICATION) - SITUATION
pt sedated overnight    slept untuil noon  once awake pt again agitated refused meds  purnima espinoza aware  dextrose and  insulin to be given  medicated again at 1830 with Ativan    will attempt iv meds when more cooperative

## 2024-02-28 NOTE — CONSULT NOTE ADULT - PROBLEM SELECTOR RECOMMENDATION 2
advancing, A&Ox1, agitated on admission, currently calm.  Reorientation, cluster care, avoid deliriogenics

## 2024-02-29 LAB
ANION GAP SERPL CALC-SCNC: 7 MMOL/L — SIGNIFICANT CHANGE UP (ref 5–17)
BUN SERPL-MCNC: 24 MG/DL — HIGH (ref 7–23)
CALCIUM SERPL-MCNC: 9.3 MG/DL — SIGNIFICANT CHANGE UP (ref 8.5–10.1)
CHLORIDE SERPL-SCNC: 109 MMOL/L — HIGH (ref 96–108)
CO2 SERPL-SCNC: 24 MMOL/L — SIGNIFICANT CHANGE UP (ref 22–31)
CREAT SERPL-MCNC: 1.53 MG/DL — HIGH (ref 0.5–1.3)
EGFR: 44 ML/MIN/1.73M2 — LOW
GLUCOSE BLDC GLUCOMTR-MCNC: 106 MG/DL — HIGH (ref 70–99)
GLUCOSE BLDC GLUCOMTR-MCNC: 142 MG/DL — HIGH (ref 70–99)
GLUCOSE BLDC GLUCOMTR-MCNC: 178 MG/DL — HIGH (ref 70–99)
GLUCOSE BLDC GLUCOMTR-MCNC: 261 MG/DL — HIGH (ref 70–99)
GLUCOSE SERPL-MCNC: 132 MG/DL — HIGH (ref 70–99)
POTASSIUM SERPL-MCNC: 4.8 MMOL/L — SIGNIFICANT CHANGE UP (ref 3.5–5.3)
POTASSIUM SERPL-SCNC: 4.8 MMOL/L — SIGNIFICANT CHANGE UP (ref 3.5–5.3)
SODIUM SERPL-SCNC: 140 MMOL/L — SIGNIFICANT CHANGE UP (ref 135–145)

## 2024-02-29 PROCEDURE — 99222 1ST HOSP IP/OBS MODERATE 55: CPT

## 2024-02-29 PROCEDURE — 99232 SBSQ HOSP IP/OBS MODERATE 35: CPT

## 2024-02-29 PROCEDURE — 99231 SBSQ HOSP IP/OBS SF/LOW 25: CPT

## 2024-02-29 PROCEDURE — 99497 ADVNCD CARE PLAN 30 MIN: CPT | Mod: 25

## 2024-02-29 RX ADMIN — Medication 50 MILLIGRAM(S): at 05:16

## 2024-02-29 RX ADMIN — HEPARIN SODIUM 5000 UNIT(S): 5000 INJECTION INTRAVENOUS; SUBCUTANEOUS at 17:16

## 2024-02-29 RX ADMIN — ATORVASTATIN CALCIUM 80 MILLIGRAM(S): 80 TABLET, FILM COATED ORAL at 20:41

## 2024-02-29 RX ADMIN — Medication 3 MILLIGRAM(S): at 20:42

## 2024-02-29 RX ADMIN — Medication 81 MILLIGRAM(S): at 11:20

## 2024-02-29 RX ADMIN — HEPARIN SODIUM 5000 UNIT(S): 5000 INJECTION INTRAVENOUS; SUBCUTANEOUS at 05:16

## 2024-02-29 RX ADMIN — Medication 6: at 16:47

## 2024-02-29 RX ADMIN — CLOPIDOGREL BISULFATE 75 MILLIGRAM(S): 75 TABLET, FILM COATED ORAL at 11:20

## 2024-02-29 NOTE — BH CONSULTATION LIAISON ASSESSMENT NOTE - CURRENT MEDICATION
MEDICATIONS  (STANDING):  aspirin  chewable 81 milliGRAM(s) Oral daily  atorvastatin 80 milliGRAM(s) Oral at bedtime  clopidogrel Tablet 75 milliGRAM(s) Oral daily  dextrose 5%. 1000 milliLiter(s) (50 mL/Hr) IV Continuous <Continuous>  dextrose 50% Injectable 25 Gram(s) IV Push once  glucagon  Injectable 1 milliGRAM(s) IntraMuscular once  heparin   Injectable 5000 Unit(s) SubCutaneous every 12 hours  insulin lispro (ADMELOG) corrective regimen sliding scale   SubCutaneous three times a day before meals  metoprolol succinate ER 50 milliGRAM(s) Oral daily  sodium chloride 0.9%. 1000 milliLiter(s) (70 mL/Hr) IV Continuous <Continuous>    MEDICATIONS  (PRN):  acetaminophen     Tablet .. 650 milliGRAM(s) Oral every 6 hours PRN Temp greater or equal to 38C (100.4F), Mild Pain (1 - 3)  aluminum hydroxide/magnesium hydroxide/simethicone Suspension 30 milliLiter(s) Oral every 4 hours PRN Dyspepsia  dextrose Oral Gel 15 Gram(s) Oral once PRN Blood Glucose LESS THAN 70 milliGRAM(s)/deciliter  LORazepam   Injectable 1 milliGRAM(s) IV Push every 4 hours PRN Agitation  melatonin 3 milliGRAM(s) Oral at bedtime PRN Insomnia  ondansetron Injectable 4 milliGRAM(s) IV Push every 8 hours PRN Nausea and/or Vomiting

## 2024-02-29 NOTE — PHYSICAL THERAPY INITIAL EVALUATION ADULT - LEVEL OF INDEPENDENCE: BED TO CHAIR, REHAB EVAL
58 year old male with a history of cirrhosis and is here for chronic diarrhea since starting Lactulose. Exam suggests chronic cirrhosis. Will check with COVID swab, labs and reassess. maximum assist (25% patients effort)

## 2024-02-29 NOTE — BH CONSULTATION LIAISON ASSESSMENT NOTE - NSBHCHARTREVIEWVS_PSY_A_CORE FT
Vital Signs Last 24 Hrs  T(C): 36.3 (29 Feb 2024 04:37), Max: 36.7 (28 Feb 2024 17:38)  T(F): 97.3 (29 Feb 2024 04:37), Max: 98 (28 Feb 2024 17:38)  HR: 65 (29 Feb 2024 07:55) (64 - 81)  BP: 125/74 (29 Feb 2024 04:37) (116/66 - 148/87)  BP(mean): --  RR: 17 (29 Feb 2024 04:37) (17 - 18)  SpO2: 98% (29 Feb 2024 04:37) (98% - 99%)    Parameters below as of 29 Feb 2024 04:37  Patient On (Oxygen Delivery Method): room air

## 2024-02-29 NOTE — BH CONSULTATION LIAISON ASSESSMENT NOTE - NSBHCHARTREVIEWLAB_PSY_A_CORE FT
02-28    140  |  109<H>  |  24<H>  ----------------------------<  132<H>  4.8   |  24  |  1.53<H>    Ca    9.3      28 Feb 2024 23:07  Phos  2.3     02-28  Mg     1.7     02-28    TPro  7.7  /  Alb  3.5  /  TBili  0.8  /  DBili  x   /  AST  25  /  ALT  19  /  AlkPhos  106  02-28

## 2024-02-29 NOTE — BH CONSULTATION LIAISON ASSESSMENT NOTE - NSBHMSEKNOW_PSY_A_CORE
"[facility]   OB/GYN Clinic    CC: Return OB     Subjective:    Allie is a 30 year old  at 12w2d by Patient's last menstrual period was 10/02/2019. c/w 7w5d US who presents for return OB visit. She reports feeling well. Denies uterine cramping, vaginal bleeding or leaking, dysuria. No FM.     Objective:  /69 (BP Location: Right arm, Patient Position: Chair, Cuff Size: Adult Regular)   Pulse 91   Temp 99  F (37.2  C) (Tympanic)   Wt 56.2 kg (124 lb)   LMP 10/02/2019   Breastfeeding No   BMI 18.85 kg/m      Estimated body mass index is 18.85 kg/m  as calculated from the following:    Height as of 12/15/19: 1.727 m (5' 8\").    Weight as of this encounter: 56.2 kg (124 lb).    Physical Exam:  Gen: Pleasant, talkative female in no apparent distress   Respiratory: breathing comfortably on room air   Cardiac: Warm and well-perfused.   GI: Abd soft and non-tender, gravid  MSK: Grossly normal movement of all four extremities  Psych: mood and affect bright     See OB flowsheet    Assessment/Plan:   30 year old  at 12w2d by LMP of Patient's last menstrual period was 10/02/2019. c/w 7w5d US who presents for follow-up OB visit.   1) New OB labs nl. Plan for 3rd tri lab at 28wks.   2) Genetics testing: ordered 1st tri, but if she misses this wants QUAD screen   3) Plan for anatomy scan at 20wks  4) Concerns: none  5) PMH/OBHx problems: subclinical hypothyroidism - reviewed previous labs and will increase to 50mcg and repeat labs in 6 wks   6) Immunizations: s/p flu     Return to clinic in 4 weeks.     Kaylee Orozco MD  OB/GYN  2019        " Unable to assess

## 2024-02-29 NOTE — BH CONSULTATION LIAISON ASSESSMENT NOTE - NSBHCONSULTRECOMMENDOTHER_PSY_A_CORE FT
Patient refuses to take oral medications at home thus eliminating the vast majority of psychiatric agents that could be used   - awaiting family's decisions regarding GOC first as that will set the basis for which road to take regarding potential "treatments"  Patient refuses to take oral medications at home thus eliminating the vast majority of psychiatric agents that could be used   - awaiting family's decisions regarding GOC first as that will set the basis for which road to take regarding potential "treatments"   - given mechanism of action and literature data, can try clonidine 0.1mg/24hrs transdermal patch q7days as it reduced impulsivity/agitation as well as control blood pressure. (takes 2-3 days to fully load). can taper patch dose as tolerated. (PMD/Internist can ideally follow and adjust dosage of patch) Patient refuses to take oral medications at home thus eliminating the vast majority of psychiatric agents that could be used   - awaiting family's decisions regarding GOC first as that will set the basis for which road to take regarding potential "treatments"   - given mechanism of action and literature data, can try clonidine 0.1mg/24hrs transdermal patch q7days as it reduced impulsivity/agitation as well as control blood pressure. (takes 2-3 days to fully load). Can taper patch dose as tolerated as well as reduce Toprol XL dose as indicated if BP drops. (PMD/Internist can ideally follow and adjust dosage of patch). NOTE: patient refuses home medications thus unlikely he will take the Toprol anyway

## 2024-02-29 NOTE — BH CONSULTATION LIAISON ASSESSMENT NOTE - NSICDXPASTMEDICALHX_GEN_ALL_CORE_FT
PAST MEDICAL HISTORY:  BPH (benign prostatic hyperplasia)     CKD (chronic kidney disease) stage 3, GFR 30-59 ml/min     Coronary artery disease involving native coronary artery of native heart, angina presence unspecified     Dementia     Diabetes     Glaucoma     Hyperlipidemia, unspecified hyperlipidemia type     Hypertension     Ischemic cardiomyopathy     Left ventricular thrombus     ST elevation myocardial infarction (STEMI), unspecified artery     Stented coronary artery 5 stents    Systolic congestive heart failure, unspecified congestive heart failure chronicity     Thyroid nodule

## 2024-02-29 NOTE — BH CONSULTATION LIAISON ASSESSMENT NOTE - HPI (INCLUDE ILLNESS QUALITY, SEVERITY, DURATION, TIMING, CONTEXT, MODIFYING FACTORS, ASSOCIATED SIGNS AND SYMPTOMS)
87yo  M, , noncaregiver, retired, with PMH of dementia, DM2, HTN, systolic CHF, HFrEF 35% s/p AICD, CKDIII, CAD s/p PCI and 5 stents, Left ventricular thrombus, glaucoma, BPH, hx of STEMI, non-compliant with home medications as per wife, known non-compliance presents to ED for chest pain, noted to be diaphoretic and tachypnic. Seen by Cardiology - chest pain with elevated troponins likely unstable angina, now asymptomatic; not a candidate for interventions. Seen by Palliative Care - May be hospice eligible depending on goals. Patient at home refuses to take medications.     ISTOP Reference #: [id number] no record of patient   CVM: no record of patient       87yo AAM, , noncaregiver, retired, with PMH of dementia, DM2, HTN, systolic CHF, HFrEF 35% s/p AICD, CKDIII, CAD s/p PCI and 5 stents, Left ventricular thrombus, glaucoma, BPH, hx of STEMI, non-compliant with home medications as per wife, known non-compliance presents to ED for chest pain, noted to be diaphoretic and tachypnic. Seen by Cardiology - chest pain with elevated troponins likely unstable angina, now asymptomatic; not a candidate for interventions. Seen by Palliative Care - May be hospice eligible depending on goals. Patient at home refuses to take medications.     EXAM; Pt sitting on chair next to his bed, awake, alert, looking around, confused and disoriented to time/place/situation which is expected. Poor historian; paucity of thought/poverty of content, no subjective complaints reported or indicated. Wife at bedside - wants to take patient home.     ISTOP Reference #: [id number] no record of patient   CVM: no record of patient

## 2024-02-29 NOTE — BH CONSULTATION LIAISON ASSESSMENT NOTE - OTHER
progressing dementia  does not seem to be responding to internal stimuli  limited  tenuous  reflective of context and diagnosis  'ok'  appropriate  fleeting  deferred

## 2024-02-29 NOTE — BH CONSULTATION LIAISON ASSESSMENT NOTE - NSSUICPROTFACT_PSY_ALL_CORE
Supportive social network of family or friends/Fear of death or the actual act of killing self/Cultural, spiritual and/or moral attitudes against suicide/Church beliefs

## 2024-02-29 NOTE — PHYSICAL THERAPY INITIAL EVALUATION ADULT - ADDITIONAL COMMENTS
pt lives with spouse in a private home, no steps to enter but I flight of steps to bedroom with right HR, pt PLOF is indep in bed mob, transfers and ambulation with SAC or RW depending on the day, currently pt require max a x 2 for sit to stand, pt present with right side weakness, unable to grab the RW with the right hand. right knee flexed too. pt able to take 1 step, presents with severe unsteady gait, pt unable to follow v.c. assisted back to chair.

## 2024-02-29 NOTE — PROGRESS NOTE ADULT - CONVERSATION DETAILS
Met with wife Ellen at bedside regarding current condition, trajectory of advancing dementia, heart disease, goals of care. She expressed that she is sole caretaker, does not have HHA, they have a daughter and he has children from a previous marriage. She indicated that she is patient's HCP. He has been refusing medications and occasionally agitated at home. He is minimally ambulatory w cane or walker. She understands that he is not a good candidate for any invasive procedures and not taking medications consistently. Her main goal is that he be comfortable at home. Educated on hospice services/philosophy, she is agreeable for home hospice, does not want him going to a facility, feels that he will do better at home and she can manage for now though it is becoming more difficult given his needs. Discussed risks/benefits of LST such as CPR, intubation, feeding tubes, she prefers to allow for natural death, agreeable for DNR/DNI, no feeding tubes, comfort measures only, no further labs. Discussed deactivation of AICD, she will think about it. Support provided. ZAHRA drafted and placed in chart. T[facility] referral made.

## 2024-02-29 NOTE — BH CONSULTATION LIAISON ASSESSMENT NOTE - SUMMARY
Advancing dementia, worsening medical conditions including cardiac functioning/status plus noncompliance with medical medications.

## 2024-02-29 NOTE — BH CONSULTATION LIAISON ASSESSMENT NOTE - RISK ASSESSMENT
low risk for intentional, planned out and executed harm to self or others given highly advanced age, physical frailty and multi-domain cognitive deficits. More likely to unintentionally/accidentally lash out at caretakers during ADL assistance or hurt self by trying to climb out of bed/pulls lines etc secondary to her chronically confused state.

## 2024-03-01 LAB
GLUCOSE BLDC GLUCOMTR-MCNC: 102 MG/DL — HIGH (ref 70–99)
GLUCOSE BLDC GLUCOMTR-MCNC: 107 MG/DL — HIGH (ref 70–99)
GLUCOSE BLDC GLUCOMTR-MCNC: 156 MG/DL — HIGH (ref 70–99)
GLUCOSE BLDC GLUCOMTR-MCNC: 168 MG/DL — HIGH (ref 70–99)
GLUCOSE BLDC GLUCOMTR-MCNC: 180 MG/DL — HIGH (ref 70–99)

## 2024-03-01 PROCEDURE — 99231 SBSQ HOSP IP/OBS SF/LOW 25: CPT

## 2024-03-01 RX ADMIN — Medication 81 MILLIGRAM(S): at 12:04

## 2024-03-01 RX ADMIN — Medication 1 PATCH: at 20:57

## 2024-03-01 RX ADMIN — Medication 1 PATCH: at 12:05

## 2024-03-01 RX ADMIN — Medication 2: at 07:48

## 2024-03-01 RX ADMIN — HEPARIN SODIUM 5000 UNIT(S): 5000 INJECTION INTRAVENOUS; SUBCUTANEOUS at 05:01

## 2024-03-01 RX ADMIN — ATORVASTATIN CALCIUM 80 MILLIGRAM(S): 80 TABLET, FILM COATED ORAL at 21:01

## 2024-03-01 RX ADMIN — Medication 3 MILLIGRAM(S): at 21:03

## 2024-03-01 RX ADMIN — Medication 2: at 17:01

## 2024-03-01 RX ADMIN — HEPARIN SODIUM 5000 UNIT(S): 5000 INJECTION INTRAVENOUS; SUBCUTANEOUS at 17:02

## 2024-03-01 RX ADMIN — CLOPIDOGREL BISULFATE 75 MILLIGRAM(S): 75 TABLET, FILM COATED ORAL at 12:05

## 2024-03-01 NOTE — BH CONSULTATION LIAISON PROGRESS NOTE - NSBHCHARTREVIEWVS_PSY_A_CORE FT
Vital Signs Last 24 Hrs  T(C): 36.3 (01 Mar 2024 10:48), Max: 36.7 (29 Feb 2024 19:11)  T(F): 97.4 (01 Mar 2024 10:48), Max: 98 (29 Feb 2024 19:11)  HR: 58 (01 Mar 2024 10:48) (54 - 70)  BP: 95/54 (01 Mar 2024 10:48) (95/54 - 131/84)  BP(mean): --  RR: 18 (01 Mar 2024 10:48) (18 - 18)  SpO2: 100% (01 Mar 2024 10:48) (96% - 100%)    Parameters below as of 01 Mar 2024 10:48  Patient On (Oxygen Delivery Method): room air

## 2024-03-01 NOTE — BH CONSULTATION LIAISON PROGRESS NOTE - OTHER
appropriate  deferred  limited  'ok'  fleeting  reflective of context and diagnosis  does not seem to be responding to internal stimuli  tenuous

## 2024-03-01 NOTE — BH CONSULTATION LIAISON PROGRESS NOTE - NSBHCHARTREVIEWLAB_PSY_A_CORE FT
02-28    140  |  109<H>  |  24<H>  ----------------------------<  132<H>  4.8   |  24  |  1.53<H>    Ca    9.3      28 Feb 2024 23:07

## 2024-03-01 NOTE — BH CONSULTATION LIAISON PROGRESS NOTE - NSBHFUPINTERVALHXFT_PSY_A_CORE
Appreciate Palliative Care GOC with wife - she signed MOLST (DNR/DNI) and opted for home hospice. Patient started on the clonidine patch yesterday - too early to ascertain efficacy as it takes 2-3 days to fully load. Toprol has been held for perimeters yesterday and today; patch may be adequate by itself to control BP. Otherwise no significant interval events. Maintaining the same psychiatric clinical presentation consistent with reported baseline. Pt has not been physically aggressive or with severe agitation at VS.

## 2024-03-01 NOTE — BH CONSULTATION LIAISON PROGRESS NOTE - NSBHCONSULTRECOMMENDOTHER_PSY_A_CORE FT
2/29/24: Patient refuses to take oral medications at home thus eliminating the vast majority of psychiatric agents that could be used   - awaiting family's decisions regarding GOC first as that will set the basis for which road to take regarding potential "treatments"   - given mechanism of action and literature data, can try clonidine 0.1mg/24hrs transdermal patch q7days as it reduced impulsivity/agitation as well as control blood pressure. (takes 2-3 days to fully load). Can taper patch dose as tolerated as well as reduce Toprol XL dose as indicated if BP drops. (PMD/Internist can ideally follow and adjust dosage of patch). NOTE: patient refuses home medications thus unlikely he will take the Toprol anyway  3/1/24: Pt now DNR/DNI with opt for home hospice services  -  started on the clonidine patch yesterday - too early to ascertain efficacy as it takes 2-3 days to fully load. Toprol has been held for perimeters yesterday and today; patch may be adequate by itself to control BP.   - comfort care  - [facility] signing off

## 2024-03-01 NOTE — BH CONSULTATION LIAISON PROGRESS NOTE - CURRENT MEDICATION
MEDICATIONS  (STANDING):  aspirin  chewable 81 milliGRAM(s) Oral daily  atorvastatin 80 milliGRAM(s) Oral at bedtime  cloNIDine Patch 0.1 mG/24Hr(s) 1 patch Transdermal every 7 days  clopidogrel Tablet 75 milliGRAM(s) Oral daily  dextrose 5%. 1000 milliLiter(s) (50 mL/Hr) IV Continuous <Continuous>  dextrose 50% Injectable 25 Gram(s) IV Push once  glucagon  Injectable 1 milliGRAM(s) IntraMuscular once  heparin   Injectable 5000 Unit(s) SubCutaneous every 12 hours  insulin lispro (ADMELOG) corrective regimen sliding scale   SubCutaneous three times a day before meals  metoprolol succinate ER 50 milliGRAM(s) Oral daily  sodium chloride 0.9%. 1000 milliLiter(s) (70 mL/Hr) IV Continuous <Continuous>    MEDICATIONS  (PRN):  acetaminophen     Tablet .. 650 milliGRAM(s) Oral every 6 hours PRN Temp greater or equal to 38C (100.4F), Mild Pain (1 - 3)  aluminum hydroxide/magnesium hydroxide/simethicone Suspension 30 milliLiter(s) Oral every 4 hours PRN Dyspepsia  dextrose Oral Gel 15 Gram(s) Oral once PRN Blood Glucose LESS THAN 70 milliGRAM(s)/deciliter  melatonin 3 milliGRAM(s) Oral at bedtime PRN Insomnia  ondansetron Injectable 4 milliGRAM(s) IV Push every 8 hours PRN Nausea and/or Vomiting

## 2024-03-01 NOTE — BH CONSULTATION LIAISON PROGRESS NOTE - NSBHPTASSESSDT_PSY_A_CORE
01-Mar-2024 12:59 Dorsal Nasal Flap Text: The defect edges were debeveled with a #15 scalpel blade.  Given the location of the defect and the proximity to free margins a dorsal nasal flap was deemed most appropriate.  Using a sterile surgical marker, an appropriate dorsal nasal flap was drawn around the defect.    The area thus outlined was incised deep to adipose tissue with a #15 scalpel blade.  The skin margins were undermined to an appropriate distance in all directions utilizing iris scissors.

## 2024-03-02 LAB
GLUCOSE BLDC GLUCOMTR-MCNC: 183 MG/DL — HIGH (ref 70–99)
GLUCOSE BLDC GLUCOMTR-MCNC: 225 MG/DL — HIGH (ref 70–99)

## 2024-03-02 PROCEDURE — 99231 SBSQ HOSP IP/OBS SF/LOW 25: CPT

## 2024-03-02 RX ADMIN — Medication 1 PATCH: at 19:48

## 2024-03-02 RX ADMIN — HEPARIN SODIUM 5000 UNIT(S): 5000 INJECTION INTRAVENOUS; SUBCUTANEOUS at 05:04

## 2024-03-02 RX ADMIN — Medication 1 PATCH: at 07:00

## 2024-03-02 RX ADMIN — ATORVASTATIN CALCIUM 80 MILLIGRAM(S): 80 TABLET, FILM COATED ORAL at 21:47

## 2024-03-02 RX ADMIN — Medication 81 MILLIGRAM(S): at 11:06

## 2024-03-02 RX ADMIN — CLOPIDOGREL BISULFATE 75 MILLIGRAM(S): 75 TABLET, FILM COATED ORAL at 11:07

## 2024-03-02 RX ADMIN — Medication 4: at 11:09

## 2024-03-02 RX ADMIN — Medication 2: at 08:20

## 2024-03-02 NOTE — PROGRESS NOTE ADULT - PROBLEM SELECTOR PLAN 4
Agitation is common at home   Required ativan   - 1:1 observation   - Ativan PRN  2/28/2024 consider psych consult
GOC discussion as above, now DNR/DNI, comfort measures, no feeding tubes. No further labs. Wife agreeable for home hospice, undecided about deactivation of AICD. Wife does not want placement. SW and primary team made aware. Palliative will follow.
Agitation is common at home   Required ativan   - 1:1 observation   - Ativan PRN  2/29/2024 await psych consult  3/1/2024 appreciate psych consult
Agitation is common at home   Required ativan   - 1:1 observation   - Ativan PRN
Agitation is common at home   Required ativan   - 1:1 observation   - Ativan PRN  2/29/2024 await psych consult
Agitation is common at home   Required ativan   - 1:1 observation   - Ativan PRN  2/29/2024 await psych consult  3/1/2024 appreciate psych consult

## 2024-03-02 NOTE — PROGRESS NOTE ADULT - PROBLEM SELECTOR PLAN 2
per my colleague's documentation "K: 6.1  Received Insulin, D50 & NaHCO3,   - Tele  - ECG  - Monitor K level   - Correct accordingly"  2/27/2024 no repeat labs done- will order one stat  2/29/204 resolved stop acei/arb
advancing, A&Ox1, intermittently agitated, usually during personal care, otherwise calm, min ambulatory  Psych eval noted, started on clonidine TTS as intermittently refusing meds  Reorientation, cluster care     DNR/DNI, wife agreeable for home hospice
per my colleague's documentation "K: 6.1  Received Insulin, D50 & NaHCO3,   - Tele  - ECG  - Monitor K level   - Correct accordingly"  2/27/2024 no repeat labs done- will order one stat  2/29/204 resolved stop acei/arb
per my colleague's documentation "K: 6.1  Received Insulin, D50 & NaHCO3,   - Tele  - ECG  - Monitor K level   - Correct accordingly"  2/27/2024 no repeat labs done- will order one stat  2/29/204 resolved stop acei/arb
per my colleague's documentation "K: 6.1  Received Insulin, D50 & NaHCO3,   - Tele  - ECG  - Monitor K level   - Correct accordingly"  2/27/2024 no repeat labs done- will order one stat  2/28/2024 ordered Lokelma - and reepat labs
per my colleague's documentation "K: 6.1  Received Insulin, D50 & NaHCO3,   - Tele  - ECG  - Monitor K level   - Correct accordingly"  2/27/2024 no repeat labs done- will order one stat

## 2024-03-02 NOTE — PROGRESS NOTE ADULT - ASSESSMENT
89 yo male with known CAD and ischemic cardiomyopathy.  Advanced dementia, refusing meds.  Episode of chest pain with elevated troponins likely unstable angina, now asymptomatic.  Not a candidate for any interventions given dementia and inability to get patient meds.  Needs psych evaluation?    Plan  - Lengthy discussion with wife and daughter at bedside from yesterday, recommend conservative approach.  - Recommend Palliative Care consult  - Recommend using sublingual nitrates for PRN chest pain and try to maintain his other maintenance meds  - Correct hyperkalemia accordingly   
88 year old male with a PMH of dementia, DM2, HTN, HFrEF 35% s/p AICD, CAD s/p PCI  presents to ED for chest pain. As per the wife, this evening patient was holding his chest, complaining of chest pain, and was found to be diaphoretic and tachypneic lasted about 20mins which prompted ED visit. Received Aspirin at home and in route by EMS. Upon ED arrival patient endorses pain has subsided, endorses he was feeling fine. Denies any chest pain, palpitation,  headache, or nay other acute symptoms. Patient became very agitated and uncooperative required ativan and 1:1 observation. Patient is non-compliant with home medication, as per wife patient refuses  med when offered. Labs remarkable for K:6.1, Trop:120.1 ECG-No ST changes, BNP:805, Cr: 1.93. BP: 199/91HR:

## 2024-03-02 NOTE — PROGRESS NOTE ADULT - PROBLEM SELECTOR PLAN 1
per my colleague's documentation "Acute onset of Chest pain lasted 20mins, received Aspirin, subsided prior to ED arrival   History of CAD s/p PCI, continue with  Telemetry,  ASA - Statin - Plavix - ECG - Echo - Cardio consult - DVT Prophylaxis  2/29/2024- per cardiology d/w family medical management
per my colleague's documentation "Acute onset of Chest pain lasted 20mins, received Aspirin, subsided prior to ED arrival   History of CAD s/p PCI, continue with  Telemetry,  ASA - Statin - Plavix - ECG - Echo - Cardio consult - DVT Prophylaxis  2/29/2024- per cardiology d/w family medical management
per my colleague's documentation "Acute onset of Chest pain lasted 20mins, received Aspirin, subsided prior to ED arrival   History of CAD s/p PCI, continue with  Telemetry,  ASA - Statin - Plavix - ECG - Echo - Cardio consult - DVT Prophylaxis
per my colleague's documentation "Acute onset of Chest pain lasted 20mins, received Aspirin, subsided prior to ED arrival   History of CAD s/p PCI, continue with  Telemetry,  ASA - Statin - Plavix - ECG - Echo - Cardio consult - DVT Prophylaxis  2/28/2024- per cardiology d/w family medical management
hx CAD s/p PCI, ICM s/p AICD, EF ~35%.  cardiology eval noted, not a candidate for invasive procedures given dementia and refusing meds. continue medical management, NTG prn. currently denies pain.  Now DNR/DNI, wife agreeable for home hospice
per my colleague's documentation "Acute onset of Chest pain lasted 20mins, received Aspirin, subsided prior to ED arrival   History of CAD s/p PCI, continue with  Telemetry,  ASA - Statin - Plavix - ECG - Echo - Cardio consult - DVT Prophylaxis  2/29/2024- per cardiology d/w family medical management

## 2024-03-02 NOTE — PROGRESS NOTE ADULT - PROBLEM SELECTOR PLAN 3
don't think this Rosibel- but rather some progression of patient exsiting  CKD stage 3 unspecified 3a or 3b as noted upon review of HIE from 2016 2/28/2024 stable
don't think this Rosibel- but rather some progression of patient exsiting  CKD stage 3 unspecified 3a or 3b as noted upon review of HIE from 2016 2/29/2024 stable
don't think this Rosibel- but rather some progression of patient exsiting  CKD stage 3 unspecified 3a or 3b as noted upon review of HIE from 2016
don't think this Rosibel- but rather some progression of patient exsiting  CKD stage 3 unspecified 3a or 3b as noted upon review of HIE from 2016 2/29/2024 stable
don't think this Rosibel- but rather some progression of patient exsiting  CKD stage 3 unspecified 3a or 3b as noted upon review of HIE from 2016 2/29/2024 stable
lives w wife, min ambulatory, assist w ADLs prn

## 2024-03-02 NOTE — PROGRESS NOTE ADULT - PROBLEM SELECTOR PROBLEM 2
Hyperkalemia
Alzheimer's dementia with agitation
Hyperkalemia
Hyperkalemia

## 2024-03-02 NOTE — PROGRESS NOTE ADULT - PROBLEM SELECTOR PROBLEM 3
VIRGILIO (acute kidney injury)
Debility

## 2024-03-02 NOTE — PROGRESS NOTE ADULT - PROBLEM SELECTOR PLAN 6
BP was elevated in ED , however  Bp is better at the moment based off most recent readings  2/28/2024 bp stable
BP was elevated in ED , however  Bp is better at the moment based off most recent readings
BP was elevated in ED , however  Bp is better at the moment based off most recent readings  2/29/2024 bp stable  3/1/2024 bp stable
BP was elevated in ED , however  Bp is better at the moment based off most recent readings  2/29/2024 bp stable  3/1/2024 bp stable
BP was elevated in ED , however  Bp is better at the moment based off most recent readings  2/29/2024 bp stable

## 2024-03-02 NOTE — PROGRESS NOTE ADULT - PROBLEM SELECTOR PLAN 5
ISS with hypoglycemia   - F/U A1C  2/29/2024 a1c at 9   fs good this morning
ISS with hypoglycemia   - F/U A1C
ISS with hypoglycemia   - F/U A1C
ISS with hypoglycemia   - F/U A1C  2/29/2024 a1c at 9   fs good this morning
ISS with hypoglycemia   - F/U A1C  2/29/2024 a1c at 9   fs good this morning

## 2024-03-02 NOTE — PROGRESS NOTE ADULT - PROBLEM SELECTOR PROBLEM 4
Alzheimer's dementia with agitation
Palliative care encounter

## 2024-03-02 NOTE — PROGRESS NOTE ADULT - SUBJECTIVE AND OBJECTIVE BOX
Patient is a 88y old  Male who presents with a chief complaint of Chest pain (27 Feb 2024 13:08)      PAST MEDICAL & SURGICAL HISTORY:  Diabetes  Hypertension  BPH (benign prostatic hyperplasia)  Glaucoma  Stented coronary artery  5 stents  Systolic congestive heart failure, unspecified congestive heart failure chronicity  Coronary artery disease involving native coronary artery of native heart, angina presence unspecified  Hyperlipidemia, unspecified hyperlipidemia type  ST elevation myocardial infarction (STEMI), unspecified artery  Left ventricular thrombus  Thyroid nodule  Ischemic cardiomyopathy  Memory loss  CKD (chronic kidney disease) stage 3, GFR 30-59 ml/min  Stented coronary artery        INTERVAL HISTORY: Pt laying in bed with no distress.   	  MEDICATIONS:  MEDICATIONS  (STANDING):  aspirin  chewable 81 milliGRAM(s) Oral daily  atorvastatin 80 milliGRAM(s) Oral at bedtime  clopidogrel Tablet 75 milliGRAM(s) Oral daily  dextrose 5%. 1000 milliLiter(s) (50 mL/Hr) IV Continuous <Continuous>  dextrose 50% Injectable 25 Gram(s) IV Push once  glucagon  Injectable 1 milliGRAM(s) IntraMuscular once  heparin   Injectable 5000 Unit(s) SubCutaneous every 12 hours  insulin lispro (ADMELOG) corrective regimen sliding scale   SubCutaneous three times a day before meals  metoprolol succinate ER 50 milliGRAM(s) Oral daily  sodium chloride 0.9%. 1000 milliLiter(s) (70 mL/Hr) IV Continuous <Continuous>  sodium zirconium cyclosilicate 5 Gram(s) Oral once    MEDICATIONS  (PRN):  acetaminophen     Tablet .. 650 milliGRAM(s) Oral every 6 hours PRN Temp greater or equal to 38C (100.4F), Mild Pain (1 - 3)  aluminum hydroxide/magnesium hydroxide/simethicone Suspension 30 milliLiter(s) Oral every 4 hours PRN Dyspepsia  dextrose Oral Gel 15 Gram(s) Oral once PRN Blood Glucose LESS THAN 70 milliGRAM(s)/deciliter  LORazepam   Injectable 1 milliGRAM(s) IV Push every 4 hours PRN Agitation  melatonin 3 milliGRAM(s) Oral at bedtime PRN Insomnia  ondansetron Injectable 4 milliGRAM(s) IV Push every 8 hours PRN Nausea and/or Vomiting      Vitals:  T(F): 97.6 (02-28-24 @ 10:48), Max: 97.7 (02-28-24 @ 01:16)  HR: 65 (02-28-24 @ 10:48) (65 - 77)  BP: 146/90 (02-28-24 @ 10:48) (120/71 - 157/90)  RR: 18 (02-28-24 @ 10:48) (18 - 18)  SpO2: 96% (02-28-24 @ 10:48) (96% - 99%)  Wt(kg): --    02-27 @ 07:01  -  02-28 @ 07:00  --------------------------------------------------------  IN:    Oral Fluid: 100 mL  Total IN: 100 mL    OUT:    Voided (mL): 300 mL  Total OUT: 300 mL    Total NET: -200 mL        Weight (kg): 65.4 (02-27 @ 04:20)  BMI (kg/m2): 19 (02-27 @ 04:20)      PHYSICAL EXAM:  Neuro: Awake, responsive, confused  CV: S1 S2 RRR  Lungs: CTABL  GI: Soft, BS +, ND, NT  Extremities: No edema    TELEMETRY: NSR 64bpm  	    ECG:  < from: 12 Lead ECG (02.26.24 @ 20:57) >    Diagnosis Line Sinus rhythm [id number] degree AV block  Left anterior fascicular block  Septal infarct , age undetermined  T wave abnormality, consider lateral ischemia  Abnormal ECG  No previous ECGs available    < end of copied text >	    RADIOLOGY:     DIAGNOSTIC TESTING:    [ x] Echocardiogram: < from: TTE Echo Complete w/o Contrast w/ Doppler (02.27.24 @ 11:41) >  Summary:   1. Moderately decreased global left ventricular systolic function.   2. Entire apex and mid and apical anterior septum are abnormal as   described above.   3. No subcostal views. Lateral and anterior walls are not well   visualized. Recommend ultrasound enhancing agent for better assessment of   LV function/wall motion.   4. No evidence of mitral valve regurgitation.  < end of copied text >    [x ]  Catheterization: < from: Cardiac Cath Lab - Adult (01.20.15 @ 18:08) >  INTERVENTIONAL IMPRESSIONS: Successful angioplasty and stenting of the  severe mid and distal RCA lesions.    < end of copied text >    [x ] Stress Test:  < from: Cardiac Treadmill Stress Test (Non Imaging) (06.17.15 @ 14:52) >  STRESS TEST IMPRESSIONS:  Patient achieved 82% of maximum predicted heart rate.  Exercise capacity: 8 METS.  Chest Pain: No chest pain with exercise.  Symptom: Leg pain.  HR Response: Appropriate.  BP Response: Appropriate.  Heart Rhythm: Sinus Bradycardia - 58 BPM.  Q Waves: Anteroseptal MI.  Baseline ECG: T wave abnormality in I  , aVL, V2, V2, V3,  V4, V5, V6.  ECG Changes: No significant ischemic ST segment changes  beyond baseline abnormalities.  Arrhythmia: Frequent VPDs occurred during recovery.    < end of copied text >      LABS:	 	    CARDIAC MARKERS:      28 Feb 2024 09:45    142    |  111    |  20     ----------------------------<  142    5.9     |  27     |  1.55   28 Feb 2024 06:05    140    |  109    |  20     ----------------------------<  152    6.1     |  27     |  1.55   27 Feb 2024 12:05    142    |  111    |  21     ----------------------------<  120    4.9     |  27     |  1.32     Ca    9.6        28 Feb 2024 09:45  Phos  2.3       28 Feb 2024 06:05  Mg     1.7       28 Feb 2024 06:05    TPro  7.7    /  Alb  3.5    /  TBili  0.8    /  DBili  x      /  AST  25     /  ALT  19     /  AlkPhos  106    28 Feb 2024 09:45                          14.2   4.28  )-----------( 176      ( 28 Feb 2024 06:05 )             44.1 ,                       13.8   5.63  )-----------( 173      ( 26 Feb 2024 21:37 )             43.7     proBNP:   Lipid Profile: 02-28 @ 06:05  HDL/Total Cholesterol: --  HDL Chol:              47 mg/dL  Serum Chol:            163 mg/dL  Direct LDL:            --  Triglycerides:         71 mg/dL    HgA1c: A1C with Estimated Average Glucose Result: 9.0: % (02.28.24 @ 06:05)      TSH: Thyroid Stimulating Hormone, Serum: 2.19 uIU/mL (01.15.15 @ 06:48)                  
Patient is a 88y old  Male who presents with a chief complaint of Chest pain (27 Feb 2024 03:37)      OVERNIGHT EVENTS:  none    MEDICATIONS  (STANDING):  aspirin  chewable 81 milliGRAM(s) Oral daily  atorvastatin 80 milliGRAM(s) Oral at bedtime  cloNIDine Patch 0.1 mG/24Hr(s) 1 patch Transdermal every 7 days  clopidogrel Tablet 75 milliGRAM(s) Oral daily  dextrose 5%. 1000 milliLiter(s) (50 mL/Hr) IV Continuous <Continuous>  dextrose 50% Injectable 25 Gram(s) IV Push once  glucagon  Injectable 1 milliGRAM(s) IntraMuscular once  heparin   Injectable 5000 Unit(s) SubCutaneous every 12 hours  insulin lispro (ADMELOG) corrective regimen sliding scale   SubCutaneous three times a day before meals  metoprolol succinate ER 50 milliGRAM(s) Oral daily    MEDICATIONS  (PRN):  acetaminophen     Tablet .. 650 milliGRAM(s) Oral every 6 hours PRN Temp greater or equal to 38C (100.4F), Mild Pain (1 - 3)  aluminum hydroxide/magnesium hydroxide/simethicone Suspension 30 milliLiter(s) Oral every 4 hours PRN Dyspepsia  dextrose Oral Gel 15 Gram(s) Oral once PRN Blood Glucose LESS THAN 70 milliGRAM(s)/deciliter  melatonin 3 milliGRAM(s) Oral at bedtime PRN Insomnia  ondansetron Injectable 4 milliGRAM(s) IV Push every 8 hours PRN Nausea and/or Vomiting      Allergies    No Known Allergies    Intolerances        SUBJECTIVE: in bed in NAD, no acute events overnight     Vital Signs Last 24 Hrs  T(C): 36.3 (02 Mar 2024 10:05), Max: 36.8 (01 Mar 2024 15:11)  T(F): 97.4 (02 Mar 2024 10:05), Max: 98.3 (01 Mar 2024 15:11)  HR: 76 (02 Mar 2024 10:05) (58 - 76)  BP: 154/76 (02 Mar 2024 10:05) (108/103 - 154/76)  BP(mean): --  RR: 18 (02 Mar 2024 10:05) (16 - 18)  SpO2: 100% (02 Mar 2024 04:44) (99% - 100%)    Parameters below as of 02 Mar 2024 04:44  Patient On (Oxygen Delivery Method): room air      PHYSICAL EXAM:  GENERAL: NAD, well-groomed, well-developed  HEAD:  Atraumatic, Normocephalic  EYES: EOMI, PERRLA, conjunctiva and sclera clear  ENMT: No tonsillar erythema, exudates, or enlargement; Moist mucous membranes, Good dentition, No lesions  NECK: Supple,   CHEST/LUNG: Clear to  auscultation bilaterally; No rales, rhonchi, wheezing, or rubs  bilaterally  HEART: Regular rate and rhythm; No murmurs, rubs, or gallops  ABDOMEN: Soft, Nontender, Nondistended; Bowel sounds present  EXTREMITIES:  2+ Peripheral Pulses, No clubbing, cyanosis, or edema BL LE  SKIN: No rashes or lesions  NERVOUS SYSTEM: alert and confused    LABS:                    02-28    140  |  109<H>  |  24<H>  ----------------------------<  132<H>  4.8   |  24  |  1.53<H>    Ca    9.3      28 Feb 2024 23:07          Urinalysis Basic - ( 26 Feb 2024 21:37 )    Color: x / Appearance: x / SG: x / pH: x  Gluc: 374 mg/dL / Ketone: x  / Bili: x / Urobili: x   Blood: x / Protein: x / Nitrite: x   Leuk Esterase: x / RBC: x / WBC x   Sq Epi: x / Non Sq Epi: x / Bacteria: x      Cultures;   CAPILLARY BLOOD GLUCOSE    CAPILLARY BLOOD GLUCOSE      POCT Blood Glucose.: 225 mg/dL (02 Mar 2024 11:00)  POCT Blood Glucose.: 183 mg/dL (02 Mar 2024 08:12)  POCT Blood Glucose.: 107 mg/dL (01 Mar 2024 22:02)  POCT Blood Glucose.: 168 mg/dL (01 Mar 2024 16:49)  POCT Blood Glucose.: 180 mg/dL (01 Mar 2024 16:14)    Lipid panel:           RADIOLOGY & ADDITIONAL TESTS:      Imaging Personally Reviewed:  [ x] YES      Consultant(s) Notes Reviewed:  [x ] YES     Care Discussed with [x ] Consultants [X ] Patient [x ] Family  [x ]    [x ]  Other; RN
follow up on:  complex medical decision making related to goals of care      OVERNIGHT EVENTS: no overnight events, sitting in chair w lap band, wife at bedside, feeding him chicken soup which he ate    Review of systems:        Unable to obtain/Limited due to poor mental status    MEDICATIONS  (STANDING):  aspirin  chewable 81 milliGRAM(s) Oral daily  atorvastatin 80 milliGRAM(s) Oral at bedtime  cloNIDine Patch 0.1 mG/24Hr(s) 1 patch Transdermal every 7 days  clopidogrel Tablet 75 milliGRAM(s) Oral daily  dextrose 5%. 1000 milliLiter(s) (50 mL/Hr) IV Continuous <Continuous>  dextrose 50% Injectable 25 Gram(s) IV Push once  glucagon  Injectable 1 milliGRAM(s) IntraMuscular once  heparin   Injectable 5000 Unit(s) SubCutaneous every 12 hours  insulin lispro (ADMELOG) corrective regimen sliding scale   SubCutaneous three times a day before meals  metoprolol succinate ER 50 milliGRAM(s) Oral daily  sodium chloride 0.9%. 1000 milliLiter(s) (70 mL/Hr) IV Continuous <Continuous>    MEDICATIONS  (PRN):  acetaminophen     Tablet .. 650 milliGRAM(s) Oral every 6 hours PRN Temp greater or equal to 38C (100.4F), Mild Pain (1 - 3)  aluminum hydroxide/magnesium hydroxide/simethicone Suspension 30 milliLiter(s) Oral every 4 hours PRN Dyspepsia  dextrose Oral Gel 15 Gram(s) Oral once PRN Blood Glucose LESS THAN 70 milliGRAM(s)/deciliter  melatonin 3 milliGRAM(s) Oral at bedtime PRN Insomnia  ondansetron Injectable 4 milliGRAM(s) IV Push every 8 hours PRN Nausea and/or Vomiting      PHYSICAL EXAM:  Vital Signs Last 24 Hrs  T(C): 36.3 (29 Feb 2024 04:37), Max: 36.7 (28 Feb 2024 17:38)  T(F): 97.3 (29 Feb 2024 04:37), Max: 98 (28 Feb 2024 17:38)  HR: 65 (29 Feb 2024 07:55) (64 - 81)  BP: 125/74 (29 Feb 2024 04:37) (116/66 - 148/87)  BP(mean): --  RR: 17 (29 Feb 2024 04:37) (17 - 18)  SpO2: 98% (29 Feb 2024 04:37) (98% - 99%)    Parameters below as of 29 Feb 2024 04:37  Patient On (Oxygen Delivery Method): room air      Palliative Performance Scale/Karnofsky Score: 40     GENERAL: alert, NAD  HEENT: Atraumatic, oropharynx clear, neck supple  CHEST/LUNG: unlabored  HEART: Regular rate and rhythm    ABDOMEN: Soft, Nontender, Nondistended   MUSCULOSKELETAL:  No  edema   NERVOUS SYSTEM:  Alert & Oriented X1,  follows simple commands  SKIN: No rashes or lesions noted  Oral intake: fair    LABS:                          14.2   4.28  )-----------( 176      ( 28 Feb 2024 06:05 )             44.1     02-28    140  |  109<H>  |  24<H>  ----------------------------<  132<H>  4.8   |  24  |  1.53<H>    Ca    9.3      28 Feb 2024 23:07  Phos  2.3     02-28  Mg     1.7     02-28    TPro  7.7  /  Alb  3.5  /  TBili  0.8  /  DBili  x   /  AST  25  /  ALT  19  /  AlkPhos  106  02-28    Urinalysis Basic - ( 28 Feb 2024 23:07 )    Color: x / Appearance: x / SG: x / pH: x  Gluc: 132 mg/dL / Ketone: x  / Bili: x / Urobili: x   Blood: x / Protein: x / Nitrite: x   Leuk Esterase: x / RBC: x / WBC x   Sq Epi: x / Non Sq Epi: x / Bacteria: x        RADIOLOGY & ADDITIONAL STUDIES:
Patient is a 88y old  Male who presents with a chief complaint of Chest pain (27 Feb 2024 03:37)      OVERNIGHT EVENTS:  none    MEDICATIONS  (STANDING):  aspirin  chewable 81 milliGRAM(s) Oral daily  atorvastatin 80 milliGRAM(s) Oral at bedtime  cloNIDine Patch 0.1 mG/24Hr(s) 1 patch Transdermal every 7 days  clopidogrel Tablet 75 milliGRAM(s) Oral daily  dextrose 5%. 1000 milliLiter(s) (50 mL/Hr) IV Continuous <Continuous>  dextrose 50% Injectable 25 Gram(s) IV Push once  glucagon  Injectable 1 milliGRAM(s) IntraMuscular once  heparin   Injectable 5000 Unit(s) SubCutaneous every 12 hours  insulin lispro (ADMELOG) corrective regimen sliding scale   SubCutaneous three times a day before meals  metoprolol succinate ER 50 milliGRAM(s) Oral daily  sodium chloride 0.9%. 1000 milliLiter(s) (70 mL/Hr) IV Continuous <Continuous>    MEDICATIONS  (PRN):  acetaminophen     Tablet .. 650 milliGRAM(s) Oral every 6 hours PRN Temp greater or equal to 38C (100.4F), Mild Pain (1 - 3)  aluminum hydroxide/magnesium hydroxide/simethicone Suspension 30 milliLiter(s) Oral every 4 hours PRN Dyspepsia  dextrose Oral Gel 15 Gram(s) Oral once PRN Blood Glucose LESS THAN 70 milliGRAM(s)/deciliter  melatonin 3 milliGRAM(s) Oral at bedtime PRN Insomnia  ondansetron Injectable 4 milliGRAM(s) IV Push every 8 hours PRN Nausea and/or Vomiting      Allergies    No Known Allergies    Intolerances        SUBJECTIVE: in bed in NAD, no acute events overnight       Vital Signs Last 24 Hrs    T(F): 97.6  HR: 65   BP: 146/90-  RR: 18  SpO2: 96%     PHYSICAL EXAM:  GENERAL: NAD, well-groomed, well-developed  HEAD:  Atraumatic, Normocephalic  EYES: EOMI, PERRLA, conjunctiva and sclera clear  ENMT: No tonsillar erythema, exudates, or enlargement; Moist mucous membranes, Good dentition, No lesions  NECK: Supple,   CHEST/LUNG: Clear to  auscultation bilaterally; No rales, rhonchi, wheezing, or rubs  bilaterally  HEART: Regular rate and rhythm; No murmurs, rubs, or gallops  ABDOMEN: Soft, Nontender, Nondistended; Bowel sounds present  EXTREMITIES:  2+ Peripheral Pulses, No clubbing, cyanosis, or edema BL LE  SKIN: No rashes or lesions  NERVOUS SYSTEM: alert and confused    LABS:                    Comprehensive Metabolic Panel (02.28.24 @ 09:45)    Potassium: 5.9 mmol/L        Urinalysis Basic - ( 26 Feb 2024 21:37 )    Color: x / Appearance: x / SG: x / pH: x  Gluc: 374 mg/dL / Ketone: x  / Bili: x / Urobili: x   Blood: x / Protein: x / Nitrite: x   Leuk Esterase: x / RBC: x / WBC x   Sq Epi: x / Non Sq Epi: x / Bacteria: x      Cultures;   CAPILLARY BLOOD GLUCOSE  CAPILLARY BLOOD GLUCOSE      POCT Blood Glucose.: 106 mg/dL (29 Feb 2024 11:17)  POCT Blood Glucose.: 142 mg/dL (29 Feb 2024 08:12)  POCT Blood Glucose.: 225 mg/dL (28 Feb 2024 20:59)  POCT Blood Glucose.: 100 mg/dL (28 Feb 2024 17:00)    Lipid panel:           RADIOLOGY & ADDITIONAL TESTS:      Imaging Personally Reviewed:  [ x] YES      Consultant(s) Notes Reviewed:  [x ] YES     Care Discussed with [x ] Consultants [X ] Patient [x ] Family  [x ]    [x ]  Other; RN
Patient is a 88y old  Male who presents with a chief complaint of Chest pain (27 Feb 2024 03:37)      OVERNIGHT EVENTS:  none    MEDICATIONS  (STANDING):  aspirin  chewable 81 milliGRAM(s) Oral daily  atorvastatin 80 milliGRAM(s) Oral at bedtime  clopidogrel Tablet 75 milliGRAM(s) Oral daily  dextrose 5%. 1000 milliLiter(s) (50 mL/Hr) IV Continuous <Continuous>  dextrose 50% Injectable 25 Gram(s) IV Push once  glucagon  Injectable 1 milliGRAM(s) IntraMuscular once  heparin   Injectable 5000 Unit(s) SubCutaneous every 12 hours  insulin lispro (ADMELOG) corrective regimen sliding scale   SubCutaneous three times a day before meals  metoprolol succinate ER 50 milliGRAM(s) Oral daily  sodium chloride 0.9%. 1000 milliLiter(s) (70 mL/Hr) IV Continuous <Continuous>    MEDICATIONS  (PRN):  acetaminophen     Tablet .. 650 milliGRAM(s) Oral every 6 hours PRN Temp greater or equal to 38C (100.4F), Mild Pain (1 - 3)  aluminum hydroxide/magnesium hydroxide/simethicone Suspension 30 milliLiter(s) Oral every 4 hours PRN Dyspepsia  dextrose Oral Gel 15 Gram(s) Oral once PRN Blood Glucose LESS THAN 70 milliGRAM(s)/deciliter  LORazepam   Injectable 1 milliGRAM(s) IV Push every 4 hours PRN Agitation  melatonin 3 milliGRAM(s) Oral at bedtime PRN Insomnia  ondansetron Injectable 4 milliGRAM(s) IV Push every 8 hours PRN Nausea and/or Vomiting    Allergies    No Known Allergies    Intolerances        SUBJECTIVE: in bed in NAD, no acute events overnight       Vital Signs Last 24 Hrs  T(C): 36.3 (29 Feb 2024 04:37), Max: 36.7 (28 Feb 2024 17:38)  T(F): 97.3 (29 Feb 2024 04:37), Max: 98 (28 Feb 2024 17:38)  HR: 65 (29 Feb 2024 07:55) (64 - 81)  BP: 125/74 (29 Feb 2024 04:37) (116/66 - 148/87)  BP(mean): --  RR: 17 (29 Feb 2024 04:37) (17 - 18)  SpO2: 98% (29 Feb 2024 04:37) (98% - 99%)    Parameters below as of 29 Feb 2024 04:37  Patient On (Oxygen Delivery Method): room air    PHYSICAL EXAM:  GENERAL: NAD, well-groomed, well-developed  HEAD:  Atraumatic, Normocephalic  EYES: EOMI, PERRLA, conjunctiva and sclera clear  ENMT: No tonsillar erythema, exudates, or enlargement; Moist mucous membranes, Good dentition, No lesions  NECK: Supple,   CHEST/LUNG: Clear to  auscultation bilaterally; No rales, rhonchi, wheezing, or rubs  bilaterally  HEART: Regular rate and rhythm; No murmurs, rubs, or gallops  ABDOMEN: Soft, Nontender, Nondistended; Bowel sounds present  EXTREMITIES:  2+ Peripheral Pulses, No clubbing, cyanosis, or edema BL LE  SKIN: No rashes or lesions  NERVOUS SYSTEM: alert and confused    LABS:                                 14.2   4.28  )-----------( 176      ( 28 Feb 2024 06:05 )             44.1   02-28    140  |  109<H>  |  24<H>  ----------------------------<  132<H>  4.8   |  24  |  1.53<H>    Ca    9.3      28 Feb 2024 23:07  Phos  2.3     02-28  Mg     1.7     02-28    TPro  7.7  /  Alb  3.5  /  TBili  0.8  /  DBili  x   /  AST  25  /  ALT  19  /  AlkPhos  106  02-28        Urinalysis Basic - ( 26 Feb 2024 21:37 )    Color: x / Appearance: x / SG: x / pH: x  Gluc: 374 mg/dL / Ketone: x  / Bili: x / Urobili: x   Blood: x / Protein: x / Nitrite: x   Leuk Esterase: x / RBC: x / WBC x   Sq Epi: x / Non Sq Epi: x / Bacteria: x      Cultures;   CAPILLARY BLOOD GLUCOSE  CAPILLARY BLOOD GLUCOSE      POCT Blood Glucose.: 106 mg/dL (29 Feb 2024 11:17)  POCT Blood Glucose.: 142 mg/dL (29 Feb 2024 08:12)  POCT Blood Glucose.: 225 mg/dL (28 Feb 2024 20:59)  POCT Blood Glucose.: 100 mg/dL (28 Feb 2024 17:00)    Lipid panel:           RADIOLOGY & ADDITIONAL TESTS:      Imaging Personally Reviewed:  [ x] YES      Consultant(s) Notes Reviewed:  [x ] YES     Care Discussed with [x ] Consultants [X ] Patient [x ] Family  [x ]    [x ]  Other; RN
Patient is a 88y old  Male who presents with a chief complaint of Chest pain (27 Feb 2024 03:37)      OVERNIGHT EVENTS:  admitted overnight      MEDICATIONS  (STANDING):  aspirin  chewable 81 milliGRAM(s) Oral daily  atorvastatin 80 milliGRAM(s) Oral at bedtime  clopidogrel Tablet 75 milliGRAM(s) Oral daily  dextrose 5%. 1000 milliLiter(s) (50 mL/Hr) IV Continuous <Continuous>  dextrose 50% Injectable 25 Gram(s) IV Push once  enalapril 10 milliGRAM(s) Oral daily  glucagon  Injectable 1 milliGRAM(s) IntraMuscular once  heparin   Injectable 5000 Unit(s) SubCutaneous every 12 hours  insulin lispro (ADMELOG) corrective regimen sliding scale   SubCutaneous three times a day before meals  metoprolol succinate ER 50 milliGRAM(s) Oral daily  sodium chloride 0.9%. 1000 milliLiter(s) (70 mL/Hr) IV Continuous <Continuous>    MEDICATIONS  (PRN):  acetaminophen     Tablet .. 650 milliGRAM(s) Oral every 6 hours PRN Temp greater or equal to 38C (100.4F), Mild Pain (1 - 3)  aluminum hydroxide/magnesium hydroxide/simethicone Suspension 30 milliLiter(s) Oral every 4 hours PRN Dyspepsia  dextrose Oral Gel 15 Gram(s) Oral once PRN Blood Glucose LESS THAN 70 milliGRAM(s)/deciliter  LORazepam   Injectable 1 milliGRAM(s) IV Push every 4 hours PRN Agitation  melatonin 3 milliGRAM(s) Oral at bedtime PRN Insomnia  ondansetron Injectable 4 milliGRAM(s) IV Push every 8 hours PRN Nausea and/or Vomiting      Allergies    No Known Allergies    Intolerances        SUBJECTIVE: in bed in NAD, no acute events overnight     T(F): 96.8 (02-27-24 @ 10:42), Max: 98.8 (02-27-24 @ 03:54)  HR: 106 (02-27-24 @ 10:42) (60 - 106)  BP: 134/86 (02-27-24 @ 10:42) (133/74 - 199/91)  RR: 18 (02-27-24 @ 10:42) (18 - 18)  SpO2: 99% (02-27-24 @ 10:42) (93% - 100%)  Wt(kg): --    PHYSICAL EXAM:  GENERAL: NAD, well-groomed, well-developed  HEAD:  Atraumatic, Normocephalic  EYES: EOMI, PERRLA, conjunctiva and sclera clear  ENMT: No tonsillar erythema, exudates, or enlargement; Moist mucous membranes, Good dentition, No lesions  NECK: Supple,   CHEST/LUNG: Clear to  auscultation bilaterally; No rales, rhonchi, wheezing, or rubs  bilaterally  HEART: Regular rate and rhythm; No murmurs, rubs, or gallops  ABDOMEN: Soft, Nontender, Nondistended; Bowel sounds present  EXTREMITIES:  2+ Peripheral Pulses, No clubbing, cyanosis, or edema BL LE  SKIN: No rashes or lesions  NERVOUS SYSTEM:     LABS:                        13.8   5.63  )-----------( 173      ( 26 Feb 2024 21:37 )             43.7     02-26    135  |  105  |  25<H>  ----------------------------<  374<H>  6.1<H>   |  28  |  1.93<H>    Ca    9.3      26 Feb 2024 21:37  Mg     1.7     02-26    TPro  8.5<H>  /  Alb  3.6  /  TBili  0.4  /  DBili  x   /  AST  16  /  ALT  21  /  AlkPhos  121<H>  02-26      Urinalysis Basic - ( 26 Feb 2024 21:37 )    Color: x / Appearance: x / SG: x / pH: x  Gluc: 374 mg/dL / Ketone: x  / Bili: x / Urobili: x   Blood: x / Protein: x / Nitrite: x   Leuk Esterase: x / RBC: x / WBC x   Sq Epi: x / Non Sq Epi: x / Bacteria: x      Cultures;   CAPILLARY BLOOD GLUCOSE      POCT Blood Glucose.: 121 mg/dL (27 Feb 2024 07:56)  POCT Blood Glucose.: 247 mg/dL (27 Feb 2024 02:57)  POCT Blood Glucose.: 291 mg/dL (27 Feb 2024 01:21)  POCT Blood Glucose.: 338 mg/dL (26 Feb 2024 21:52)  POCT Blood Glucose.: 365 mg/dL (26 Feb 2024 20:32)    Lipid panel:           RADIOLOGY & ADDITIONAL TESTS:      Imaging Personally Reviewed:  [ x] YES      Consultant(s) Notes Reviewed:  [x ] YES     Care Discussed with [x ] Consultants [X ] Patient [x ] Family  [x ]    [x ]  Other; RN
Patient is a 88y old  Male who presents with a chief complaint of Chest pain (27 Feb 2024 03:37)      OVERNIGHT EVENTS:  none    MEDICATIONS  (STANDING):  aspirin  chewable 81 milliGRAM(s) Oral daily  atorvastatin 80 milliGRAM(s) Oral at bedtime  cloNIDine Patch 0.1 mG/24Hr(s) 1 patch Transdermal every 7 days  clopidogrel Tablet 75 milliGRAM(s) Oral daily  dextrose 5%. 1000 milliLiter(s) (50 mL/Hr) IV Continuous <Continuous>  dextrose 50% Injectable 25 Gram(s) IV Push once  glucagon  Injectable 1 milliGRAM(s) IntraMuscular once  heparin   Injectable 5000 Unit(s) SubCutaneous every 12 hours  insulin lispro (ADMELOG) corrective regimen sliding scale   SubCutaneous three times a day before meals  metoprolol succinate ER 50 milliGRAM(s) Oral daily  sodium chloride 0.9%. 1000 milliLiter(s) (70 mL/Hr) IV Continuous <Continuous>    MEDICATIONS  (PRN):  acetaminophen     Tablet .. 650 milliGRAM(s) Oral every 6 hours PRN Temp greater or equal to 38C (100.4F), Mild Pain (1 - 3)  aluminum hydroxide/magnesium hydroxide/simethicone Suspension 30 milliLiter(s) Oral every 4 hours PRN Dyspepsia  dextrose Oral Gel 15 Gram(s) Oral once PRN Blood Glucose LESS THAN 70 milliGRAM(s)/deciliter  melatonin 3 milliGRAM(s) Oral at bedtime PRN Insomnia  ondansetron Injectable 4 milliGRAM(s) IV Push every 8 hours PRN Nausea and/or Vomiting  Allergies    No Known Allergies    Intolerances        SUBJECTIVE: in bed in NAD, no acute events overnight     Vital Signs Last 24 Hrs  T(C): 36.3 (03-01-24 @ 10:48), Max: 36.7 (02-29-24 @ 19:11)  T(F): 97.4 (03-01-24 @ 10:48), Max: 98 (02-29-24 @ 19:11)  HR: 58 (03-01-24 @ 10:48) (54 - 70)  BP: 95/54 (03-01-24 @ 10:48) (95/54 - 131/84)  BP(mean): --  RR: 18 (03-01-24 @ 10:48) (18 - 18)  SpO2: 100% (03-01-24 @ 10:48) (96% - 100%)      PHYSICAL EXAM:  GENERAL: NAD, well-groomed, well-developed  HEAD:  Atraumatic, Normocephalic  EYES: EOMI, PERRLA, conjunctiva and sclera clear  ENMT: No tonsillar erythema, exudates, or enlargement; Moist mucous membranes, Good dentition, No lesions  NECK: Supple,   CHEST/LUNG: Clear to  auscultation bilaterally; No rales, rhonchi, wheezing, or rubs  bilaterally  HEART: Regular rate and rhythm; No murmurs, rubs, or gallops  ABDOMEN: Soft, Nontender, Nondistended; Bowel sounds present  EXTREMITIES:  2+ Peripheral Pulses, No clubbing, cyanosis, or edema BL LE  SKIN: No rashes or lesions  NERVOUS SYSTEM: alert and confused    LABS:                    02-28    140  |  109<H>  |  24<H>  ----------------------------<  132<H>  4.8   |  24  |  1.53<H>    Ca    9.3      28 Feb 2024 23:07          Urinalysis Basic - ( 26 Feb 2024 21:37 )    Color: x / Appearance: x / SG: x / pH: x  Gluc: 374 mg/dL / Ketone: x  / Bili: x / Urobili: x   Blood: x / Protein: x / Nitrite: x   Leuk Esterase: x / RBC: x / WBC x   Sq Epi: x / Non Sq Epi: x / Bacteria: x      Cultures;   CAPILLARY BLOOD GLUCOSE      POCT Blood Glucose.: 102 mg/dL (01 Mar 2024 10:44)  POCT Blood Glucose.: 156 mg/dL (01 Mar 2024 07:39)  POCT Blood Glucose.: 178 mg/dL (29 Feb 2024 20:38)  POCT Blood Glucose.: 261 mg/dL (29 Feb 2024 16:39)    Lipid panel:           RADIOLOGY & ADDITIONAL TESTS:      Imaging Personally Reviewed:  [ x] YES      Consultant(s) Notes Reviewed:  [x ] YES     Care Discussed with [x ] Consultants [X ] Patient [x ] Family  [x ]    [x ]  Other; RN

## 2024-03-02 NOTE — PROGRESS NOTE ADULT - NSPROGADDITIONALINFOA_GEN_ALL_CORE
dispo- d/w wife and son at bedside on 2/28/2024 poc . get psych consult / await , get PT t f/u as their fist attempt unsuccessful.    wife has difficulty managing at home    I d/w them to consider LTC
dispo- d/w wife and son at bedside on 2/28/2024 poc . get psych consult / await , get PT t f/u as their fist attempt unsuccessful.    wife has difficulty managing at home    I d/w them to consider LTC  3/1/2024 wife agrees for home with hospice- planning was started on 2/29/2024
dispo- d/w wife and son at bedside on 2/28/2024 poc . get psych consult / await , get PT t f/u as their fist attempt unsuccessful.    wife has difficulty managing at home    I d/w them to consider LTC  3/1/2024 wife agrees for home with hospice- planning was started on 2/29/2024
dispo- d/w wife and son at bedside on 2/28/2024 poc . get psych consult / await , get PT t f/u as their fist attempt unsuccessful.    wife has difficulty managing at home    I d/w them to consider LTC

## 2024-03-03 ENCOUNTER — TRANSCRIPTION ENCOUNTER (OUTPATIENT)
Age: 89
End: 2024-03-03

## 2024-03-03 VITALS
RESPIRATION RATE: 18 BRPM | DIASTOLIC BLOOD PRESSURE: 82 MMHG | HEART RATE: 79 BPM | OXYGEN SATURATION: 98 % | TEMPERATURE: 98 F | SYSTOLIC BLOOD PRESSURE: 135 MMHG

## 2024-03-03 LAB — GLUCOSE BLDC GLUCOMTR-MCNC: 302 MG/DL — HIGH (ref 70–99)

## 2024-03-03 PROCEDURE — 99239 HOSP IP/OBS DSCHRG MGMT >30: CPT

## 2024-03-03 RX ORDER — ASPIRIN/CALCIUM CARB/MAGNESIUM 324 MG
1 TABLET ORAL
Qty: 30 | Refills: 0
Start: 2024-03-03 | End: 2024-04-01

## 2024-03-03 RX ORDER — SITAGLIPTIN 50 MG/1
1 TABLET, FILM COATED ORAL
Qty: 30 | Refills: 0
Start: 2024-03-03 | End: 2024-04-01

## 2024-03-03 RX ORDER — METOPROLOL TARTRATE 50 MG
1 TABLET ORAL
Qty: 0 | Refills: 0 | DISCHARGE
Start: 2024-03-03

## 2024-03-03 RX ORDER — ATORVASTATIN CALCIUM 80 MG/1
1 TABLET, FILM COATED ORAL
Qty: 30 | Refills: 0
Start: 2024-03-03 | End: 2024-04-01

## 2024-03-03 RX ORDER — CLOPIDOGREL BISULFATE 75 MG/1
1 TABLET, FILM COATED ORAL
Qty: 30 | Refills: 0
Start: 2024-03-03 | End: 2024-04-01

## 2024-03-03 RX ORDER — ATORVASTATIN CALCIUM 80 MG/1
1 TABLET, FILM COATED ORAL
Qty: 0 | Refills: 0 | DISCHARGE
Start: 2024-03-03

## 2024-03-03 RX ADMIN — Medication 50 MILLIGRAM(S): at 06:24

## 2024-03-03 RX ADMIN — Medication 8: at 08:05

## 2024-03-03 RX ADMIN — HEPARIN SODIUM 5000 UNIT(S): 5000 INJECTION INTRAVENOUS; SUBCUTANEOUS at 06:23

## 2024-03-03 NOTE — DISCHARGE NOTE PROVIDER - CARE PROVIDER_API CALL
follow up primary care doctor and doctor of hospice team,   Phone: (   )    -  Fax: (   )    -  Follow Up Time:

## 2024-03-03 NOTE — DISCHARGE NOTE PROVIDER - NSDCCPCAREPLAN_GEN_ALL_CORE_FT
PRINCIPAL DISCHARGE DIAGNOSIS  Diagnosis: Chest pain  Assessment and Plan of Treatment:       SECONDARY DISCHARGE DIAGNOSES  Diagnosis: Diabetes mellitus  Assessment and Plan of Treatment:     Diagnosis: Benign essential HTN  Assessment and Plan of Treatment:     Diagnosis: Stage 3 chronic kidney disease  Assessment and Plan of Treatment:     Diagnosis: Dementia  Assessment and Plan of Treatment:     Diagnosis: Hyperkalemia  Assessment and Plan of Treatment:

## 2024-03-03 NOTE — DISCHARGE NOTE PROVIDER - PROVIDER TOKENS
FREE:[LAST:[follow up primary care doctor and doctor of hospice team],PHONE:[(   )    -],FAX:[(   )    -]]

## 2024-03-03 NOTE — DISCHARGE NOTE PROVIDER - NSDCFUSCHEDAPPT_GEN_ALL_CORE_FT
[facility] Physician [facility]  [address] [address]  Scheduled Appointment: 03/28/2024

## 2024-03-03 NOTE — DISCHARGE NOTE PROVIDER - NSDCMRMEDTOKEN_GEN_ALL_CORE_FT
aspirin 81 mg oral tablet, chewable: 1 tab(s) orally once a day  atorvastatin 80 mg oral tablet: 1 tab(s) orally once a day (at bedtime)  cloNIDine 0.1 mg/24 hr transdermal film, extended release: 1 patch transdermal every 7 days apply to middle of back  Januvia 50 mg oral tablet: 1 tab(s) orally once a day  metoprolol succinate 50 mg oral tablet, extended release: 1 tab(s) orally once a day  Plavix 75 mg oral tablet: 1 tab(s) orally once a day

## 2024-03-03 NOTE — DISCHARGE NOTE PROVIDER - HOSPITAL COURSE
· Assessment	  88 year old male with a PMH of dementia, DM2, HTN, HFrEF 35% s/p AICD, CAD s/p PCI  presents to ED for chest pain. As per the wife, this evening patient was holding his chest, complaining of chest pain, and was found to be diaphoretic and tachypneic lasted about 20mins which prompted ED visit. Received Aspirin at home and in route by EMS. Upon ED arrival patient endorses pain has subsided, endorses he was feeling fine. Denies any chest pain, palpitation,  headache, or nay other acute symptoms. Patient became very agitated and uncooperative required ativan and 1:1 observation. Patient is non-compliant with home medication, as per wife patient refuses  med when offered. Labs remarkable for K:6.1, Trop:120.1 ECG-No ST changes, BNP:805, Cr: 1.93. BP: 199/91HR:       Problem/Plan - 1:  ·  Problem: Chest pain.   ·  Plan: per my colleague's documentation "Acute onset of Chest pain lasted 20mins, received Aspirin, subsided prior to ED arrival   History of CAD s/p PCI, continue with  Telemetry,  ASA - Statin - Plavix - ECG - Echo - Cardio consult - DVT Prophylaxis  2/29/2024- per cardiology d/w family medical management.     Problem/Plan - 2:  ·  Problem: Hyperkalemia.   ·  Plan: per my colleague's documentation "K: 6.1  Received Insulin, D50 & NaHCO3,   - Tele  - ECG  - Monitor K level   - Correct accordingly"  2/27/2024 no repeat labs done- will order one stat  2/29/204 resolved stop acei/arb.     Problem/Plan - 3:  ·  Problem: ROSIBEL (acute kidney injury).   ·  Plan: don't think this Rosibel- but rather some progression of patient exsiting  CKD stage 3 unspecified 3a or 3b as noted upon review of HIE from 2016 2/29/2024 stable.     Problem/Plan - 4:  ·  Problem: Alzheimer's dementia with agitation.   ·  Plan: Agitation is common at home   Required ativan   - 1:1 observation   - Ativan PRN  2/29/2024 await psych consult  3/1/2024 appreciate psych consult.     Problem/Plan - 5:  ·  Problem: Diabetes mellitus.   ·  Plan: ISS with hypoglycemia   - F/U A1C  2/29/2024 a1c at 9   fs good this morning.     Problem/Plan - 6:  ·  Problem: Hypertension.   ·  Plan: BP was elevated in ED , however  Bp is better at the moment based off most recent readings  2/29/2024 bp stable  3/1/2024 bp stable.       Additional Information:  Additional Information: dispo- d/w wife and son at bedside on 2/28/2024 poc . get psych consult / await , get PT t f/u as their fist attempt unsuccessful.    wife has difficulty managing at home    I d/w them to consider LTC  3/1/2024 wife agrees for home with hospice- planning was started on 2/29/2024

## 2024-03-03 NOTE — DISCHARGE NOTE PROVIDER - ATTENDING DISCHARGE PHYSICAL EXAMINATION:
PHYSICAL EXAM:  GENERAL: NAD, well-groomed, well-developed  HEAD:  Atraumatic, Normocephalic  EYES: EOMI, PERRLA, conjunctiva and sclera clear  ENMT: No tonsillar erythema, exudates, or enlargement; Moist mucous membranes, Good dentition, No lesions  NECK: Supple,   CHEST/LUNG: Clear to  auscultation bilaterally; No rales, rhonchi, wheezing, or rubs  bilaterally  HEART: Regular rate and rhythm; No murmurs, rubs, or gallops  ABDOMEN: Soft, Nontender, Nondistended; Bowel sounds present  EXTREMITIES:  2+ Peripheral Pulses, No clubbing, cyanosis, or edema BL LE  SKIN: No rashes or lesions  NERVOUS SYSTEM: alert and confused

## 2024-03-03 NOTE — DISCHARGE NOTE NURSING/CASE MANAGEMENT/SOCIAL WORK - PATIENT PORTAL LINK FT
You can access the FollowMyHealth Patient Portal offered by [facility] by registering at the following website: [email or web address]. By joining [facility]’s FollowMyHealth portal, you will also be able to view your health information using other applications (apps) compatible with our system.

## 2024-03-08 DIAGNOSIS — I44.0 ATRIOVENTRICULAR BLOCK, FIRST DEGREE: ICD-10-CM

## 2024-03-08 DIAGNOSIS — Z66 DO NOT RESUSCITATE: ICD-10-CM

## 2024-03-08 DIAGNOSIS — Z79.82 LONG TERM (CURRENT) USE OF ASPIRIN: ICD-10-CM

## 2024-03-08 DIAGNOSIS — E11.22 TYPE 2 DIABETES MELLITUS WITH DIABETIC CHRONIC KIDNEY DISEASE: ICD-10-CM

## 2024-03-08 DIAGNOSIS — I25.5 ISCHEMIC CARDIOMYOPATHY: ICD-10-CM

## 2024-03-08 DIAGNOSIS — Z51.5 ENCOUNTER FOR PALLIATIVE CARE: ICD-10-CM

## 2024-03-08 DIAGNOSIS — R79.89 OTHER SPECIFIED ABNORMAL FINDINGS OF BLOOD CHEMISTRY: ICD-10-CM

## 2024-03-08 DIAGNOSIS — G30.9 ALZHEIMER'S DISEASE, UNSPECIFIED: ICD-10-CM

## 2024-03-08 DIAGNOSIS — H40.9 UNSPECIFIED GLAUCOMA: ICD-10-CM

## 2024-03-08 DIAGNOSIS — F02.811 DEMENTIA IN OTHER DISEASES CLASSIFIED ELSEWHERE, UNSPECIFIED SEVERITY, WITH AGITATION: ICD-10-CM

## 2024-03-08 DIAGNOSIS — Z53.20 PROCEDURE AND TREATMENT NOT CARRIED OUT BECAUSE OF PATIENT'S DECISION FOR UNSPECIFIED REASONS: ICD-10-CM

## 2024-03-08 DIAGNOSIS — Z79.899 OTHER LONG TERM (CURRENT) DRUG THERAPY: ICD-10-CM

## 2024-03-08 DIAGNOSIS — N40.0 BENIGN PROSTATIC HYPERPLASIA WITHOUT LOWER URINARY TRACT SYMPTOMS: ICD-10-CM

## 2024-03-08 DIAGNOSIS — E78.5 HYPERLIPIDEMIA, UNSPECIFIED: ICD-10-CM

## 2024-03-08 DIAGNOSIS — Z79.84 LONG TERM (CURRENT) USE OF ORAL HYPOGLYCEMIC DRUGS: ICD-10-CM

## 2024-03-08 DIAGNOSIS — Z79.01 LONG TERM (CURRENT) USE OF ANTICOAGULANTS: ICD-10-CM

## 2024-03-08 DIAGNOSIS — I50.22 CHRONIC SYSTOLIC (CONGESTIVE) HEART FAILURE: ICD-10-CM

## 2024-03-08 DIAGNOSIS — E11.65 TYPE 2 DIABETES MELLITUS WITH HYPERGLYCEMIA: ICD-10-CM

## 2024-03-08 DIAGNOSIS — I13.0 HYPERTENSIVE HEART AND CHRONIC KIDNEY DISEASE WITH HEART FAILURE AND STAGE 1 THROUGH STAGE 4 CHRONIC KIDNEY DISEASE, OR UNSPECIFIED CHRONIC KIDNEY DISEASE: ICD-10-CM

## 2024-03-08 DIAGNOSIS — Z91.148 PATIENT'S OTHER NONCOMPLIANCE WITH MEDICATION REGIMEN FOR OTHER REASON: ICD-10-CM

## 2024-03-08 DIAGNOSIS — R07.9 CHEST PAIN, UNSPECIFIED: ICD-10-CM

## 2024-03-08 DIAGNOSIS — I25.110 ATHEROSCLEROTIC HEART DISEASE OF NATIVE CORONARY ARTERY WITH UNSTABLE ANGINA PECTORIS: ICD-10-CM

## 2024-03-08 DIAGNOSIS — Z95.5 PRESENCE OF CORONARY ANGIOPLASTY IMPLANT AND GRAFT: ICD-10-CM

## 2024-03-08 DIAGNOSIS — Z79.02 LONG TERM (CURRENT) USE OF ANTITHROMBOTICS/ANTIPLATELETS: ICD-10-CM

## 2024-03-08 DIAGNOSIS — Z95.810 PRESENCE OF AUTOMATIC (IMPLANTABLE) CARDIAC DEFIBRILLATOR: ICD-10-CM

## 2024-03-08 DIAGNOSIS — F05 DELIRIUM DUE TO KNOWN PHYSIOLOGICAL CONDITION: ICD-10-CM

## 2024-03-08 DIAGNOSIS — I25.2 OLD MYOCARDIAL INFARCTION: ICD-10-CM

## 2024-03-08 DIAGNOSIS — E87.5 HYPERKALEMIA: ICD-10-CM

## 2024-03-08 DIAGNOSIS — N18.32 CHRONIC KIDNEY DISEASE, STAGE 3B: ICD-10-CM

## 2024-03-28 ENCOUNTER — NON-APPOINTMENT (OUTPATIENT)
Age: 89
End: 2024-03-28

## 2024-03-28 ENCOUNTER — APPOINTMENT (OUTPATIENT)
Dept: ELECTROPHYSIOLOGY | Facility: CLINIC | Age: 89
End: 2024-03-28
Payer: MEDICARE

## 2024-03-28 PROBLEM — F03.90 UNSPECIFIED DEMENTIA WITHOUT BEHAVIORAL DISTURBANCE: Chronic | Status: ACTIVE | Noted: 2024-02-29

## 2024-03-28 PROCEDURE — 93295 DEV INTERROG REMOTE 1/2/MLT: CPT

## 2024-03-28 PROCEDURE — 93296 REM INTERROG EVL PM/IDS: CPT

## 2024-06-26 ENCOUNTER — APPOINTMENT (OUTPATIENT)
Dept: ELECTROPHYSIOLOGY | Facility: CLINIC | Age: 89
End: 2024-06-26

## 2024-09-25 ENCOUNTER — APPOINTMENT (OUTPATIENT)
Dept: ELECTROPHYSIOLOGY | Facility: CLINIC | Age: 89
End: 2024-09-25